# Patient Record
Sex: FEMALE | Race: ASIAN | Employment: FULL TIME | URBAN - METROPOLITAN AREA
[De-identification: names, ages, dates, MRNs, and addresses within clinical notes are randomized per-mention and may not be internally consistent; named-entity substitution may affect disease eponyms.]

---

## 2018-04-27 ENCOUNTER — OFFICE VISIT (OUTPATIENT)
Dept: INTERNAL MEDICINE CLINIC | Age: 33
End: 2018-04-27

## 2018-04-27 VITALS
BODY MASS INDEX: 31.02 KG/M2 | RESPIRATION RATE: 15 BRPM | OXYGEN SATURATION: 98 % | HEART RATE: 71 BPM | DIASTOLIC BLOOD PRESSURE: 74 MMHG | WEIGHT: 193 LBS | TEMPERATURE: 98.2 F | SYSTOLIC BLOOD PRESSURE: 100 MMHG | HEIGHT: 66 IN

## 2018-04-27 DIAGNOSIS — R11.2 NAUSEA AND VOMITING, INTRACTABILITY OF VOMITING NOT SPECIFIED, UNSPECIFIED VOMITING TYPE: ICD-10-CM

## 2018-04-27 DIAGNOSIS — R51.9 ACUTE NONINTRACTABLE HEADACHE, UNSPECIFIED HEADACHE TYPE: Primary | ICD-10-CM

## 2018-04-27 DIAGNOSIS — R09.81 SINUS CONGESTION: ICD-10-CM

## 2018-04-27 RX ORDER — ONDANSETRON 8 MG/1
8 TABLET, ORALLY DISINTEGRATING ORAL
Qty: 6 TAB | Refills: 0 | Status: SHIPPED | OUTPATIENT
Start: 2018-04-27 | End: 2018-04-29

## 2018-04-27 RX ORDER — FLUTICASONE PROPIONATE 50 MCG
2 SPRAY, SUSPENSION (ML) NASAL DAILY
Qty: 1 BOTTLE | Refills: 1 | Status: SHIPPED | OUTPATIENT
Start: 2018-04-27

## 2018-04-27 NOTE — PROGRESS NOTES
HISTORY OF PRESENT ILLNESS  Kalyan Parra is a 28 y.o. female. Patient reports 3 episodes of vomiting this morning, mostly phlegm. She has history of infrequent migraines and reports a migraine headache starting last night at posterior frontal/top of head region. Since vomiting this morning, the headache has improved. She took advil about 6 hours ago. She denies any vision changes. Patient has been dealing with URI/allergy symptoms all week, including worsening nasal congestion and sinus pressure. She has taken sudafed, antihistamine, and mucinex with mild relief. She does report significant post-nasal drip. She typically takes fioricet for migraines, but hasn't taken a dose with this episode. She also reports being on her menses currently. She reports residual nausea this morning, but no fever, abdominal pain, or diarrhea. Visit Vitals    /74 (BP 1 Location: Left arm, BP Patient Position: Sitting)    Pulse 71    Temp 98.2 °F (36.8 °C) (Oral)    Resp 15    Ht 5' 6\" (1.676 m)    Wt 193 lb (87.5 kg)    LMP 04/25/2018 (Exact Date)    SpO2 98%    BMI 31.15 kg/m2       Vomiting    The history is provided by the patient. This is a new problem. The current episode started 3 to 5 hours ago. The problem has been gradually improving. The emesis has an appearance of clear. There has been no fever. Associated symptoms include headaches, URI and headaches. Headache   Associated symptoms include headaches. Review of Systems   HENT: Positive for congestion. Gastrointestinal: Positive for nausea and vomiting. Neurological: Positive for headaches. Physical Exam   Constitutional: She is oriented to person, place, and time. She appears well-developed and well-nourished. HENT:   Head: Normocephalic. Right Ear: Hearing, tympanic membrane, external ear and ear canal normal.   Left Ear: Hearing, tympanic membrane, external ear and ear canal normal.   Nose: Mucosal edema and rhinorrhea present. Right sinus exhibits no maxillary sinus tenderness and no frontal sinus tenderness. Left sinus exhibits no maxillary sinus tenderness and no frontal sinus tenderness. Mouth/Throat: Uvula is midline, oropharynx is clear and moist and mucous membranes are normal.   Neck: Normal range of motion. Neck supple. Cardiovascular: Normal rate, regular rhythm and normal heart sounds. Pulmonary/Chest: Effort normal and breath sounds normal.   Lymphadenopathy:     She has no cervical adenopathy. Neurological: She is alert and oriented to person, place, and time. No cranial nerve deficit. Coordination normal.   Skin: Skin is warm and dry. Psychiatric: She has a normal mood and affect. ASSESSMENT and PLAN    ICD-10-CM ICD-9-CM    1. Acute nonintractable headache, unspecified headache type R51 784.0    2. Nausea and vomiting, intractability of vomiting not specified, unspecified vomiting type R11.2 787.01    3.  Sinus congestion R09.81 478.19      Orders Placed This Encounter    ondansetron (ZOFRAN ODT) 8 mg disintegrating tablet    fluticasone (FLONASE) 50 mcg/actuation nasal spray   zofran given for nausea  Advised to take NSAID or fioricet if headache returns today  Start flonase  Hydrate and rest

## 2018-04-27 NOTE — PROGRESS NOTES
Chief Complaint   Patient presents with    Vomiting     Pt c/o vomiting a few times    Headache     Pt c/o headche that started in the middle of the night. 1. Have you been to the ER, urgent care clinic since your last visit? Hospitalized since your last visit? No    2. Have you seen or consulted any other health care providers outside of the [address] [place] [address] since your last visit? Include any pap smears or colon screening.  No

## 2019-11-26 LAB
ANTIBODY SCREEN, EXTERNAL: NEGATIVE
CHLAMYDIA, EXTERNAL: NEGATIVE
HBSAG, EXTERNAL: NEGATIVE
HIV, EXTERNAL: NORMAL
N. GONORRHEA, EXTERNAL: NEGATIVE
RUBELLA, EXTERNAL: NORMAL
T. PALLIDUM, EXTERNAL: NORMAL
TYPE, ABO & RH, EXTERNAL: NORMAL

## 2020-05-22 ENCOUNTER — HOSPITAL ENCOUNTER (EMERGENCY)
Age: 35
Discharge: STILL ADMITTED OR ADMINISTRATIVE | End: 2020-05-22
Attending: STUDENT IN AN ORGANIZED HEALTH CARE EDUCATION/TRAINING PROGRAM

## 2020-05-22 ENCOUNTER — HOSPITAL ENCOUNTER (OUTPATIENT)
Age: 35
Setting detail: OBSERVATION
Discharge: HOME | End: 2020-05-23
Attending: OBSTETRICS & GYNECOLOGY | Admitting: OBSTETRICS & GYNECOLOGY
Payer: COMMERCIAL

## 2020-05-22 PROBLEM — O14.93 PRE-ECLAMPSIA DURING PREGNANCY IN THIRD TRIMESTER, ANTEPARTUM: Status: ACTIVE | Noted: 2020-05-22

## 2020-05-22 LAB
ALBUMIN SERPL-MCNC: 2.8 G/DL (ref 3.5–5)
ALBUMIN/GLOB SERPL: 0.8 {RATIO} (ref 1.1–2.2)
ALP SERPL-CCNC: 80 U/L (ref 45–117)
ALT SERPL-CCNC: 15 U/L (ref 12–78)
ANION GAP SERPL CALC-SCNC: 6 MMOL/L (ref 5–15)
AST SERPL-CCNC: 13 U/L (ref 15–37)
BASOPHILS # BLD: 0.1 K/UL (ref 0–0.1)
BASOPHILS NFR BLD: 0 % (ref 0–1)
BILIRUB SERPL-MCNC: 0.2 MG/DL (ref 0.2–1)
BUN SERPL-MCNC: 11 MG/DL (ref 6–20)
BUN/CREAT SERPL: 17 (ref 12–20)
CALCIUM SERPL-MCNC: 9.1 MG/DL (ref 8.5–10.1)
CHLORIDE SERPL-SCNC: 108 MMOL/L (ref 97–108)
CO2 SERPL-SCNC: 23 MMOL/L (ref 21–32)
CREAT SERPL-MCNC: 0.63 MG/DL (ref 0.55–1.02)
CREAT UR-MCNC: 138 MG/DL
DIFFERENTIAL METHOD BLD: ABNORMAL
EOSINOPHIL # BLD: 0.1 K/UL (ref 0–0.4)
EOSINOPHIL NFR BLD: 1 % (ref 0–7)
ERYTHROCYTE [DISTWIDTH] IN BLOOD BY AUTOMATED COUNT: 17.2 % (ref 11.5–14.5)
GLOBULIN SER CALC-MCNC: 3.5 G/DL (ref 2–4)
GLUCOSE SERPL-MCNC: 78 MG/DL (ref 65–100)
HCT VFR BLD AUTO: 35.1 % (ref 35–47)
HGB BLD-MCNC: 11.1 G/DL (ref 11.5–16)
IMM GRANULOCYTES # BLD AUTO: 0.2 K/UL (ref 0–0.04)
IMM GRANULOCYTES NFR BLD AUTO: 2 % (ref 0–0.5)
LDH SERPL L TO P-CCNC: 169 U/L (ref 81–246)
LYMPHOCYTES # BLD: 1.5 K/UL (ref 0.8–3.5)
LYMPHOCYTES NFR BLD: 13 % (ref 12–49)
MCH RBC QN AUTO: 24 PG (ref 26–34)
MCHC RBC AUTO-ENTMCNC: 31.6 G/DL (ref 30–36.5)
MCV RBC AUTO: 76 FL (ref 80–99)
MONOCYTES # BLD: 0.8 K/UL (ref 0–1)
MONOCYTES NFR BLD: 7 % (ref 5–13)
NEUTS SEG # BLD: 9.1 K/UL (ref 1.8–8)
NEUTS SEG NFR BLD: 77 % (ref 32–75)
NRBC # BLD: 0.03 K/UL (ref 0–0.01)
NRBC BLD-RTO: 0.3 PER 100 WBC
PLATELET # BLD AUTO: 214 K/UL (ref 150–400)
PMV BLD AUTO: 12.7 FL (ref 8.9–12.9)
POTASSIUM SERPL-SCNC: 4.2 MMOL/L (ref 3.5–5.1)
PROT SERPL-MCNC: 6.3 G/DL (ref 6.4–8.2)
PROT UR-MCNC: 56 MG/DL (ref 0–11.9)
PROT/CREAT UR-RTO: 0.4
RBC # BLD AUTO: 4.62 M/UL (ref 3.8–5.2)
SARS-COV-2, COV2: NOT DETECTED
SODIUM SERPL-SCNC: 137 MMOL/L (ref 136–145)
SPECIMEN SOURCE, FCOV2M: NORMAL
URATE SERPL-MCNC: 8.2 MG/DL (ref 2.6–6)
WBC # BLD AUTO: 11.8 K/UL (ref 3.6–11)

## 2020-05-22 PROCEDURE — 83615 LACTATE (LD) (LDH) ENZYME: CPT

## 2020-05-22 PROCEDURE — 99285 EMERGENCY DEPT VISIT HI MDM: CPT

## 2020-05-22 PROCEDURE — 74011250636 HC RX REV CODE- 250/636: Performed by: OBSTETRICS & GYNECOLOGY

## 2020-05-22 PROCEDURE — 99218 HC RM OBSERVATION: CPT

## 2020-05-22 PROCEDURE — 84550 ASSAY OF BLOOD/URIC ACID: CPT

## 2020-05-22 PROCEDURE — 85025 COMPLETE CBC W/AUTO DIFF WBC: CPT

## 2020-05-22 PROCEDURE — 96372 THER/PROPH/DIAG INJ SC/IM: CPT

## 2020-05-22 PROCEDURE — 74011250637 HC RX REV CODE- 250/637: Performed by: OBSTETRICS & GYNECOLOGY

## 2020-05-22 PROCEDURE — 80053 COMPREHEN METABOLIC PANEL: CPT

## 2020-05-22 PROCEDURE — 84156 ASSAY OF PROTEIN URINE: CPT

## 2020-05-22 PROCEDURE — 36415 COLL VENOUS BLD VENIPUNCTURE: CPT

## 2020-05-22 PROCEDURE — 87635 SARS-COV-2 COVID-19 AMP PRB: CPT

## 2020-05-22 RX ORDER — BUTALBITAL, ACETAMINOPHEN AND CAFFEINE 50; 325; 40 MG/1; MG/1; MG/1
1 TABLET ORAL
Status: DISCONTINUED | OUTPATIENT
Start: 2020-05-22 | End: 2020-05-23 | Stop reason: HOSPADM

## 2020-05-22 RX ORDER — CETIRIZINE HYDROCHLORIDE 10 MG/1
CAPSULE, LIQUID FILLED ORAL
COMMUNITY
End: 2020-06-24

## 2020-05-22 RX ORDER — SWAB
1 SWAB, NON-MEDICATED MISCELLANEOUS DAILY
Status: DISCONTINUED | OUTPATIENT
Start: 2020-05-23 | End: 2020-05-23 | Stop reason: HOSPADM

## 2020-05-22 RX ORDER — SERTRALINE HYDROCHLORIDE 50 MG/1
100 TABLET, FILM COATED ORAL DAILY
Status: DISCONTINUED | OUTPATIENT
Start: 2020-05-23 | End: 2020-05-23 | Stop reason: HOSPADM

## 2020-05-22 RX ORDER — ACETAMINOPHEN 325 MG/1
650 TABLET ORAL
Status: DISCONTINUED | OUTPATIENT
Start: 2020-05-22 | End: 2020-05-23 | Stop reason: HOSPADM

## 2020-05-22 RX ORDER — PROMETHAZINE HYDROCHLORIDE 25 MG/1
25 TABLET ORAL
Status: DISCONTINUED | OUTPATIENT
Start: 2020-05-22 | End: 2020-05-23 | Stop reason: HOSPADM

## 2020-05-22 RX ORDER — SODIUM CHLORIDE 0.9 % (FLUSH) 0.9 %
5-40 SYRINGE (ML) INJECTION AS NEEDED
Status: DISCONTINUED | OUTPATIENT
Start: 2020-05-22 | End: 2020-05-23 | Stop reason: HOSPADM

## 2020-05-22 RX ORDER — SODIUM CHLORIDE 0.9 % (FLUSH) 0.9 %
5-40 SYRINGE (ML) INJECTION EVERY 8 HOURS
Status: DISCONTINUED | OUTPATIENT
Start: 2020-05-22 | End: 2020-05-23 | Stop reason: HOSPADM

## 2020-05-22 RX ORDER — BETAMETHASONE SODIUM PHOSPHATE AND BETAMETHASONE ACETATE 3; 3 MG/ML; MG/ML
12 INJECTION, SUSPENSION INTRA-ARTICULAR; INTRALESIONAL; INTRAMUSCULAR; SOFT TISSUE EVERY 24 HOURS
Status: COMPLETED | OUTPATIENT
Start: 2020-05-22 | End: 2020-05-23

## 2020-05-22 RX ORDER — FAMOTIDINE 20 MG/1
20 TABLET, FILM COATED ORAL 2 TIMES DAILY
COMMUNITY
End: 2020-06-24

## 2020-05-22 RX ADMIN — BUTALBITAL, ACETAMINOPHEN, AND CAFFEINE 1 TABLET: 50; 325; 40 TABLET ORAL at 17:23

## 2020-05-22 RX ADMIN — BUTALBITAL, ACETAMINOPHEN, AND CAFFEINE 1 TABLET: 50; 325; 40 TABLET ORAL at 12:19

## 2020-05-22 RX ADMIN — BUTALBITAL, ACETAMINOPHEN, AND CAFFEINE 1 TABLET: 50; 325; 40 TABLET ORAL at 22:01

## 2020-05-22 RX ADMIN — BETAMETHASONE SODIUM PHOSPHATE AND BETAMETHASONE ACETATE 12 MG: 3; 3 INJECTION, SUSPENSION INTRA-ARTICULAR; INTRALESIONAL; INTRAMUSCULAR at 14:23

## 2020-05-22 NOTE — PROGRESS NOTES
1130  Pt arrived to L&D c/o headache, and elevated BP's, denies epigastric pain or blurred vision. [address] and sent. 1150  Dr. Lacy Car at bedside to examine pt, orders received. 36  Dr. Lacy Car notified of lab results, will be by to talk with pt.  1300  Dr. Sourav Almaguer at bedside, discussed plan for care, all questions answered. 1500  Pt sleeping. 1900  TRANSFER - OUT REPORT:    Verbal report given to TU Huitron RN(name) on Jose Quintana  being transferred to [facility]U(unit) for routine progression of care       Report consisted of patients Situation, Background, Assessment and   Recommendations(SBAR). Information from the following report(s) SBAR, Kardex, [facility] and Recent Results was reviewed with the receiving nurse. Lines:       Opportunity for questions and clarification was provided.       Patient transported with:   Registered Nurse

## 2020-05-22 NOTE — ED TRIAGE NOTES
Triage Note: Patient is coming in with leg swelling and headache since last week. Tuesday started with headache. Patient was seen at [place] office and had protein in urine.
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
## 2020-05-22 NOTE — PROGRESS NOTES
TRANSFER - IN REPORT:    Verbal report received from Sandhya RN (name) on Teddy Cassette  being received from L and D (unit) for routine post - op      Report consisted of patients Situation, Background, Assessment and   Recommendations(SBAR). Information from the following report(s) SBAR, Intake/Output, MAR, Accordion and Recent Results was reviewed with the receiving nurse. Opportunity for questions and clarification was provided. Assessment completed upon patients arrival to unit and care assumed.

## 2020-05-23 VITALS
WEIGHT: 246 LBS | DIASTOLIC BLOOD PRESSURE: 89 MMHG | SYSTOLIC BLOOD PRESSURE: 148 MMHG | HEART RATE: 90 BPM | OXYGEN SATURATION: 99 % | HEIGHT: 66 IN | BODY MASS INDEX: 39.53 KG/M2 | RESPIRATION RATE: 16 BRPM | TEMPERATURE: 98.3 F

## 2020-05-23 LAB
ALBUMIN SERPL-MCNC: 2.7 G/DL (ref 3.5–5)
ALBUMIN/GLOB SERPL: 0.7 {RATIO} (ref 1.1–2.2)
ALP SERPL-CCNC: 72 U/L (ref 45–117)
ALT SERPL-CCNC: 15 U/L (ref 12–78)
ANION GAP SERPL CALC-SCNC: 5 MMOL/L (ref 5–15)
AST SERPL-CCNC: 11 U/L (ref 15–37)
BILIRUB SERPL-MCNC: <0.1 MG/DL (ref 0.2–1)
BUN SERPL-MCNC: 13 MG/DL (ref 6–20)
BUN/CREAT SERPL: 18 (ref 12–20)
CALCIUM SERPL-MCNC: 9.1 MG/DL (ref 8.5–10.1)
CHLORIDE SERPL-SCNC: 108 MMOL/L (ref 97–108)
CO2 SERPL-SCNC: 25 MMOL/L (ref 21–32)
CREAT SERPL-MCNC: 0.73 MG/DL (ref 0.55–1.02)
ERYTHROCYTE [DISTWIDTH] IN BLOOD BY AUTOMATED COUNT: 17 % (ref 11.5–14.5)
GLOBULIN SER CALC-MCNC: 3.8 G/DL (ref 2–4)
GLUCOSE SERPL-MCNC: 99 MG/DL (ref 65–100)
HCT VFR BLD AUTO: 31.7 % (ref 35–47)
HGB BLD-MCNC: 10 G/DL (ref 11.5–16)
MCH RBC QN AUTO: 24 PG (ref 26–34)
MCHC RBC AUTO-ENTMCNC: 31.5 G/DL (ref 30–36.5)
MCV RBC AUTO: 76.2 FL (ref 80–99)
NRBC # BLD: 0.05 K/UL (ref 0–0.01)
NRBC BLD-RTO: 0.4 PER 100 WBC
PLATELET # BLD AUTO: 205 K/UL (ref 150–400)
PMV BLD AUTO: 11.9 FL (ref 8.9–12.9)
POTASSIUM SERPL-SCNC: 3.8 MMOL/L (ref 3.5–5.1)
PROT SERPL-MCNC: 6.5 G/DL (ref 6.4–8.2)
RBC # BLD AUTO: 4.16 M/UL (ref 3.8–5.2)
SODIUM SERPL-SCNC: 138 MMOL/L (ref 136–145)
WBC # BLD AUTO: 13.3 K/UL (ref 3.6–11)

## 2020-05-23 PROCEDURE — 74011250636 HC RX REV CODE- 250/636: Performed by: OBSTETRICS & GYNECOLOGY

## 2020-05-23 PROCEDURE — 96372 THER/PROPH/DIAG INJ SC/IM: CPT

## 2020-05-23 PROCEDURE — 80053 COMPREHEN METABOLIC PANEL: CPT

## 2020-05-23 PROCEDURE — 74011250637 HC RX REV CODE- 250/637: Performed by: OBSTETRICS & GYNECOLOGY

## 2020-05-23 PROCEDURE — 99218 HC RM OBSERVATION: CPT

## 2020-05-23 PROCEDURE — 59025 FETAL NON-STRESS TEST: CPT

## 2020-05-23 PROCEDURE — 85027 COMPLETE CBC AUTOMATED: CPT

## 2020-05-23 PROCEDURE — 36415 COLL VENOUS BLD VENIPUNCTURE: CPT

## 2020-05-23 RX ORDER — BUTALBITAL, ACETAMINOPHEN AND CAFFEINE 50; 325; 40 MG/1; MG/1; MG/1
1 TABLET ORAL
Status: COMPLETED | OUTPATIENT
Start: 2020-05-23 | End: 2020-05-23

## 2020-05-23 RX ORDER — LANOLIN ALCOHOL/MO/W.PET/CERES
400 CREAM (GRAM) TOPICAL DAILY
Status: DISCONTINUED | OUTPATIENT
Start: 2020-05-23 | End: 2020-05-23 | Stop reason: HOSPADM

## 2020-05-23 RX ORDER — DIPHENHYDRAMINE HCL 25 MG
25 CAPSULE ORAL
Status: DISCONTINUED | OUTPATIENT
Start: 2020-05-23 | End: 2020-05-23 | Stop reason: HOSPADM

## 2020-05-23 RX ORDER — LABETALOL 100 MG/1
100 TABLET, FILM COATED ORAL EVERY 12 HOURS
Status: DISCONTINUED | OUTPATIENT
Start: 2020-05-23 | End: 2020-05-23 | Stop reason: HOSPADM

## 2020-05-23 RX ORDER — LABETALOL 100 MG/1
100 TABLET, FILM COATED ORAL EVERY 12 HOURS
Qty: 60 TAB | Refills: 0 | Status: ON HOLD
Start: 2020-05-23 | End: 2020-06-20 | Stop reason: SDUPTHER

## 2020-05-23 RX ORDER — PROCHLORPERAZINE MALEATE 10 MG
10 TABLET ORAL
Status: DISCONTINUED | OUTPATIENT
Start: 2020-05-23 | End: 2020-05-23 | Stop reason: HOSPADM

## 2020-05-23 RX ORDER — LANOLIN ALCOHOL/MO/W.PET/CERES
400 CREAM (GRAM) TOPICAL DAILY
Qty: 60 TAB | Refills: 0 | Status: SHIPPED
Start: 2020-05-23 | End: 2020-06-24

## 2020-05-23 RX ORDER — PROCHLORPERAZINE MALEATE 10 MG
10 TABLET ORAL
Qty: 30 TAB | Refills: 0 | Status: SHIPPED
Start: 2020-05-23 | End: 2020-05-30

## 2020-05-23 RX ADMIN — LABETALOL HYDROCHLORIDE 100 MG: 100 TABLET, FILM COATED ORAL at 12:00

## 2020-05-23 RX ADMIN — BETAMETHASONE SODIUM PHOSPHATE AND BETAMETHASONE ACETATE 12 MG: 3; 3 INJECTION, SUSPENSION INTRA-ARTICULAR; INTRALESIONAL; INTRAMUSCULAR at 16:45

## 2020-05-23 RX ADMIN — Medication 1 TABLET: at 10:23

## 2020-05-23 RX ADMIN — BUTALBITAL, ACETAMINOPHEN, AND CAFFEINE 1 TABLET: 50; 325; 40 TABLET ORAL at 10:40

## 2020-05-23 RX ADMIN — DIPHENHYDRAMINE HYDROCHLORIDE 25 MG: 25 CAPSULE ORAL at 12:00

## 2020-05-23 RX ADMIN — PROCHLORPERAZINE MALEATE 10 MG: 10 TABLET ORAL at 12:00

## 2020-05-23 RX ADMIN — Medication 400 MG: at 19:24

## 2020-05-23 RX ADMIN — SERTRALINE HYDROCHLORIDE 100 MG: 50 TABLET ORAL at 10:24

## 2020-05-23 RX ADMIN — BUTALBITAL, ACETAMINOPHEN, AND CAFFEINE 1 TABLET: 50; 325; 40 TABLET ORAL at 10:23

## 2020-05-23 NOTE — PROGRESS NOTES
..Bedside shift change report given to Jennifer Samson (oncoming nurse) by Brigette Delgado RN (offgoing nurse). Report included the following information SBAR, Kardex, Intake/Output, MAR, Accordion and Recent Results.

## 2020-05-23 NOTE — PROGRESS NOTES
Ante Partum Progress Note    Sarai Yoder  33w3d    1. Anxiety disorder -  Continue Zoloft       2. Elevated blood-pressure reading without diagnosis of hypertension -  -BPs consistently mild range, some 150s/100s --> will start 100 mg PO BID labetalol. -HA no worse but not improved with fioricet. Trial of Benadryl/Compazine & Mg 400 mg PO  -Reviewed case with MFM -- does not sound like pre-e with severe features, delivery not indicated at this time -- continue plan for expectant mgmt.  -Plan BID NST  -Continue to observe, with hope that if feeling better in AM will be able to go home.        3. Gestation period, 33 weeks  -Reviewed plan for delivery at 37w  -Given potential for early delivery will give BMTZ, 2nd dose today 2PM.  -COVID swab neg. Orders/Charges: High and Non Stress Test    Patient states she has no new complaints. No fetal concerns. Still left sided HA, no visual changes or RUQ pain. Feels like swelling maybe a little worse. Vitals:  Visit Vitals  /80 (BP 1 Location: Right arm, BP Patient Position: At rest;Head of bed elevated (Comment degrees)) Comment (BP Patient Position): HOB 45   Pulse 92   Temp 98.5 °F (36.9 °C)   Resp 16   Ht 5' 6\" (1.676 m)   Wt 111.6 kg (246 lb)   SpO2 97%   Breastfeeding No   BMI 39.71 kg/m²     Temp (24hrs), Av.6 °F (37 °C), Min:98.5 °F (36.9 °C), Max:98.7 °F (37.1 °C)      Last 24hr Input/Output:  No intake or output data in the 24 hours ending 20 1136     Non stress test:  Reactive    Patient Vitals for the past 4 hrs: Mode Fetal Heart Rate Fetal Activity   20 1125 External     20 1010   Present      Patient Vitals for the past 4 hrs: Mode Fetal Heart Rate Fetal Activity   20 1125 External     20 1010   Present        Uterine Activity: None     Exam:  Patient without distress.      Abdomen, fundus soft non-tender     Extremities, no redness or tenderness               Additional Exam: Deferred    Labs: Lab Results   Component Value Date/Time    WBC 11.8 (H) 05/22/2020 11:40 AM    HGB 11.1 (L) 05/22/2020 11:40 AM    HCT 35.1 05/22/2020 11:40 AM    PLATELET 487 34/64/4207 11:40 AM       Recent Results (from the past 24 hour(s))   CBC WITH AUTOMATED DIFF    Collection Time: 05/22/20 11:40 AM   Result Value Ref Range    WBC 11.8 (H) 3.6 - 11.0 K/uL    RBC 4.62 3.80 - 5.20 M/uL    HGB 11.1 (L) 11.5 - 16.0 g/dL    HCT 35.1 35.0 - 47.0 %    MCV 76.0 (L) 80.0 - 99.0 FL    MCH 24.0 (L) 26.0 - 34.0 PG    MCHC 31.6 30.0 - 36.5 g/dL    RDW 17.2 (H) 11.5 - 14.5 %    PLATELET 778 435 - 601 K/uL    MPV 12.7 8.9 - 12.9 FL    NRBC 0.3 (H) 0  WBC    ABSOLUTE NRBC 0.03 (H) 0.00 - 0.01 K/uL    NEUTROPHILS 77 (H) 32 - 75 %    LYMPHOCYTES 13 12 - 49 %    MONOCYTES 7 5 - 13 %    EOSINOPHILS 1 0 - 7 %    BASOPHILS 0 0 - 1 %    IMMATURE GRANULOCYTES 2 (H) 0.0 - 0.5 %    ABS. NEUTROPHILS 9.1 (H) 1.8 - 8.0 K/UL    ABS. LYMPHOCYTES 1.5 0.8 - 3.5 K/UL    ABS. MONOCYTES 0.8 0.0 - 1.0 K/UL    ABS. EOSINOPHILS 0.1 0.0 - 0.4 K/UL    ABS. BASOPHILS 0.1 0.0 - 0.1 K/UL    ABS. IMM. GRANS. 0.2 (H) 0.00 - 0.04 K/UL    DF AUTOMATED     METABOLIC PANEL, COMPREHENSIVE    Collection Time: 05/22/20 11:40 AM   Result Value Ref Range    Sodium 137 136 - 145 mmol/L    Potassium 4.2 3.5 - 5.1 mmol/L    Chloride 108 97 - 108 mmol/L    CO2 23 21 - 32 mmol/L    Anion gap 6 5 - 15 mmol/L    Glucose 78 65 - 100 mg/dL    BUN 11 6 - 20 MG/DL    Creatinine 0.63 0.55 - 1.02 MG/DL    BUN/Creatinine ratio 17 12 - 20      GFR est AA >60 >60 ml/min/1.73m2    GFR est non-AA >60 >60 ml/min/1.73m2    Calcium 9.1 8.5 - 10.1 MG/DL    Bilirubin, total 0.2 0.2 - 1.0 MG/DL    ALT (SGPT) 15 12 - 78 U/L    AST (SGOT) 13 (L) 15 - 37 U/L    Alk.  phosphatase 80 45 - 117 U/L    Protein, total 6.3 (L) 6.4 - 8.2 g/dL    Albumin 2.8 (L) 3.5 - 5.0 g/dL    Globulin 3.5 2.0 - 4.0 g/dL    A-G Ratio 0.8 (L) 1.1 - 2.2     URIC ACID    Collection Time: 05/22/20 11:40 AM   Result Value Ref Range    Uric acid 8.2 (H) 2.6 - 6.0 MG/DL   LD    Collection Time: 05/22/20 11:40 AM   Result Value Ref Range     81 - 246 U/L   PROTEIN/CREATININE RATIO, URINE    Collection Time: 05/22/20 11:40 AM   Result Value Ref Range    Protein, urine random 56 (H) 0.0 - 11.9 mg/dL    Creatinine, urine 138.00 mg/dL    Protein/Creat.  urine Ratio 0.4     SARS-COV-2    Collection Time: 05/22/20  2:29 PM   Result Value Ref Range    Specimen source Nasopharyngeal      SARS-CoV-2 Not detected NOTD

## 2020-05-23 NOTE — PROGRESS NOTES
Bedside and Verbal shift change report given to TU Stoo RN (oncoming nurse) by CARLA Kc RN (offgoing nurse). Report included the following information SBAR, MAR and Recent Results. 1030   at [facility]. Notified of patient's /80,R arm sitting. Patient also L sided throbbing HA that is ongoing. Patient reports she vomited small amount of clear fluid a few minutes ago. Not nauseous. Fioricet 1 po given. 1155   at bedside discussing POC. Viewed fetal monitoring strip. 80  Dr. Hardy Dumont notified that lab never received lab work that was sent at   [address] [facility]. I informed Dr. Hardy Dumont that patient is sleeping at present and he agreed that lab work and [address] could be given when patient awakens (patient received compazine and benadry po at 1200, and Labetalol at 1200 today also). [facility]  Dr. Hardy Dumont notified of patient's BP of 148/89, R arm, sitting in bed. TAYLOR is totally gone, no visual disturbances, or RUQ pain. Dr. Hardy Dumont also notified that labs drawn and sent at [phone] 74. Patient may go outside in [facility] 23 with . 1845  Received order to discharge home. Rx will be called to patient's pharmacy. Return appointment is next Friday with a virtual office appointment to be scheduled on Tuesday. 1910  Discharge instructions given and reviewed with patient. All questions answered. Patient knows what signs and symptoms to be alert for and when to call her doctor. Patient knows when next appointment is. Patient knows what medications to continue at home. Patient instructed to rest daily and to call her OB if any questions. 1930  Patient left unit via WC.

## 2020-05-23 NOTE — DISCHARGE SUMMARY
Antepartum  Discharge Summary     Patient ID:  Joseline Apodaca  [id number]  5 y.o.  1985    Admit date: 5/22/2020    Discharge date: 5/23/2020    Admission Diagnoses:    Patient Active Problem List   Diagnosis Code    Pap smear for cervical cancer screening Z12.4    Gluten intolerance K90.41    Eczema L30.9    Anxiety F41.9    Obesity E66.9    Pre-eclampsia during pregnancy in third trimester, antepartum O14.93       Discharge Diagnoses: There are no discharge diagnoses documented for the most recent discharge. Patient Active Problem List   Diagnosis Code    Pap smear for cervical cancer screening Z12.4    Gluten intolerance K90.41    Eczema L30.9    Anxiety F41.9    Obesity E66.9    Pre-eclampsia during pregnancy in third trimester, antepartum O14.93       Procedures for this admission: Serial BPs, bloodwork, NST, Betamethasone    Hospital Course: Undelivered    Disposition: Home or self care    Discharged Condition: stable            Patient Instructions:   Current Discharge Medication List      START taking these medications    Details   labetaloL (NORMODYNE) 100 mg tablet Take 1 Tab by mouth every twelve (12) hours. Qty: 60 Tab, Refills: 0      magnesium oxide (MAG-OX) 400 mg tablet Take 1 Tab by mouth daily. Qty: 60 Tab, Refills: 0      prochlorperazine (COMPAZINE) 10 mg tablet Take 1 Tab by mouth every eight (8) hours as needed for Nausea or Vomiting for up to 7 days. Qty: 30 Tab, Refills: 0         CONTINUE these medications which have NOT CHANGED    Details   Cetirizine (ZyrTEC) 10 mg cap Take  by mouth. PNV Comb #2-Iron-Omega 3-FA [id number] mg cmpk Take  by mouth. famotidine (Pepcid) 20 mg tablet Take 20 mg by mouth two (2) times a day. sertraline (ZOLOFT) 50 mg tablet Take 1 Tab by mouth daily. Qty: 90 Tab, Refills: 3    Associated Diagnoses: Anxiety      fluticasone (FLONASE) 50 mcg/actuation nasal spray 2 Sprays by Both Nostrils route daily.   Qty: 1 Bottle, Refills: 1         STOP taking these medications       norgestimate-ethinyl estradiol (ORTHO-CYCLEN, 28,) 0.25-35 mg-mcg per tablet Comments:   Reason for Stopping:             Activity: Activity as tolerated and Ambulate in house  Diet: Regular Diet    Follow-up with   Follow-up Appointments   Procedures    FOLLOW UP VISIT Appointment in: 3 - 5 Days     Standing Status:   Standing     Number of Occurrences:   1     Order Specific Question:   Appointment in     Answer:   3 - 5 Days        Signed:  Aristeo Lei MD  5/23/2020  7:06 PM

## 2020-05-23 NOTE — DISCHARGE INSTRUCTIONS
Patient Education   Patient Education        Weeks 32 to 29 of Your Pregnancy: Care Instructions  Your Care Instructions    During the last few weeks of your pregnancy, you may have more aches and pains. It's important to rest when you can. Your growing baby is putting more pressure on your bladder. So you may need to urinate more often. Hemorrhoids are also common. These are painful, itchy veins in the rectal area. In the 36th week, most women have a test for group B streptococcus (GBS). GBS is a common bacteria that can live in the vagina and rectum. It can make your baby sick after birth. If you test positive, you will get antibiotics during labor. These will keep your baby from getting the bacteria. You may want to talk with your doctor about banking your baby's umbilical cord blood. This is the blood left in the cord after birth. If you want to save this blood, you must arrange it ahead of time. You can't decide at the last minute. If you haven't already had the Tdap shot during this pregnancy, talk to your doctor about getting it. It will help protect your  against pertussis infection. Follow-up care is a key part of your treatment and safety. Be sure to make and go to all appointments, and call your doctor if you are having problems. It's also a good idea to know your test results and keep a list of the medicines you take. How can you care for yourself at home? Ease hemorrhoids  · Get more liquids, fruits, vegetables, and fiber in your diet. This will help keep your stools soft. · Avoid sitting for too long. Lie on your left side several times a day. · Clean yourself with soft, moist toilet paper. Or you can use witch hazel pads or personal hygiene pads. · If you are uncomfortable, try ice packs. Or you can sit in a warm sitz bath. Do these for 20 minutes at a time, as needed. · Use hydrocortisone cream for pain and itching. Two examples are Anusol and Preparation H Hydrocortisone.   · Ask your doctor about taking an over-the-counter stool softener. Consider breastfeeding  · Experts recommend that women breastfeed for 1 year or longer. Breast milk is the perfect food for babies. · Breast milk is easier for babies to digest than formula. And it is always available, just the right temperature, and free. · Breast milk may help protect your child from some health problems.  babies are less likely than formula-fed babies to:  ? Get ear infections, colds, diarrhea, and pneumonia. ? Be obese or get diabetes later in life. · Women who breastfeed have less bleeding after the birth. Their uteruses also shrink back faster. · Some women who breastfeed lose weight faster. Making milk burns calories. · Breastfeeding can lower your risk of breast cancer, ovarian cancer, and osteoporosis. Decide about circumcision for boys  · As you make this decision, it may help to think about your personal, Mandaeism, and family traditions. You get to decide if you will keep your son's penis natural or if he will be circumcised. · If you decide that you would like to have your baby circumcised, talk with your doctor. You can share your concerns about pain. And you can discuss your preferences for anesthesia. Where can you learn more? Go to http://santy-mayo.info/  Enter X711 in the search box to learn more about \"Weeks 32 to 34 of Your Pregnancy: Care Instructions. \"  Current as of: May 29, 2019Content Version: 12.4  © 3452-4658 Healthwise, Incorporated. Care instructions adapted under license by [employer] (which disclaims liability or warranty for this information). If you have questions about a medical condition or this instruction, always ask your healthcare professional. [place] [address] any warranty or liability for your use of this information.             Preeclampsia: Care Instructions  Overview    Preeclampsia occurs when a woman's blood pressure rises during pregnancy. Often with preeclampsia, you also have swelling in your legs, hands, and face. A test may show too much protein in your urine. Preeclampsia is also called toxemia. If preeclampsia is severe and not treated, it can lead to seizures (eclampsia) and damage to your liver or kidneys. Preeclampsia can prevent your baby from getting enough food and oxygen. This can cause a low birth weight or other problems. Your doctor will watch you closely to prevent these problems. He or she also may recommend that you reduce your activity. If your preeclampsia is a danger to your health or the health of your baby, your doctor may need to deliver your baby early. While preeclampsia is a concern, most women with preeclampsia have healthy babies. After a woman gives birth, preeclampsia usually goes away on its own. But symptoms may last a few weeks or more and can get worse after delivery. Rarely, symptoms of preeclampsia don't show up until days or even weeks after childbirth. Follow-up care is a key part of your treatment and safety. Be sure to make and go to all appointments, and call your doctor if you are having problems. It's also a good idea to know your test results and keep a list of the medicines you take. How can you care for yourself at home? · Take and record your blood pressure at home if your doctor tells you to. ? Learn the importance of the two measures of blood pressure (such as 120 over 80, or 120/80). The first number is the systolic pressure, which is the force of blood on the artery walls as the heart pumps. The second number is the diastolic pressure, which is the force of blood on the artery walls between heartbeats, when the heart is at rest. You have a choice of monitors to use. ? Manual monitor: You pump up the cuff and use a stethoscope to listen for your pulse. ? Electronic monitor: The cuff inflates, and a gauge shows your pulse rate.   ? To take your blood pressure:  ? Ask your doctor to check your blood pressure monitor to be sure that it is accurate and that the cuff fits you. Also ask your doctor to watch you to make sure that you are using it right. ? You should not eat, use tobacco products, or use medicine known to raise blood pressure (such as some nasal decongestant sprays) before you take your blood pressure. ? Avoid taking your blood pressure if you have just exercised. Also avoid taking it if you are nervous or upset. Rest at least 15 minutes before you take your blood pressure. · If your doctor advises, check the protein levels in your urine. Your doctor or nurse will show you how to do this. · Take your medicines exactly as prescribed. Call your doctor if you think you are having a problem with your medicine. · Do not smoke. Quitting smoking will help improve your baby's growth and health. If you need help quitting, talk to your doctor about stop-smoking programs and medicines. These can increase your chances of quitting for good. · Eat a balanced and healthy diet that has lots of fruits and vegetables. · If your doctor advised bed rest, be sure to stay off your feet and rest as much as possible. ? Keep a phone, phone book, notepad, and pen near the bed where you can easily reach them. ? Gently stretch your legs every hour to maintain good blood flow. ? Have another family member pack snacks and lunch food in a cooler close to your bed. ? Use this time for activities that you usually cannot find time for, such as reading, craft projects, or letter writing. · You can keep track of your baby's health by noting the length of time it takes to count 10 movements (such as kicks, flutters, or rolls). Feeling 10 movements in less than 1 hour is considered normal. Track your baby's movements once each day. Bring this record with you to each prenatal visit. When should you call for help? Call 911 anytime you think you may need emergency care.  For example, call if:    · You passed out (lost consciousness).     · You have a seizure.    Call your doctor now or seek immediate medical care if:    · You have symptoms of preeclampsia, such as:  ? Sudden swelling of your face, hands, or feet. ? New vision problems (such as dimness, blurring, or seeing spots). ? A severe headache.     · Your blood pressure is higher than it should be, or it rises suddenly.     · You have new nausea or vomiting.     · You think that you are in labor.     · You have pain in your belly or pelvis.    Watch closely for changes in your health, and be sure to contact your doctor if:    · You gain weight rapidly. Where can you learn more? Go to http://santy-mayo.info/  Enter Z954 in the search box to learn more about \"Preeclampsia: Care Instructions. \"  Current as of: May 29, 2019Content Version: 12.4  © 7976-4147 Healthwise, Incorporated. Care instructions adapted under license by [employer] (which disclaims liability or warranty for this information). If you have questions about a medical condition or this instruction, always ask your healthcare professional. [address] any warranty or liability for your use of this information.

## 2020-05-23 NOTE — PROGRESS NOTES
Notified by nurse that pt is feeling better. HA has resolved. BPs mild range. Recent labs WNL. Reviewed plan for discharge home on labetalol with close outpatient follow up. BP and symptom precautions reviewed. Reviewed continued recommendation for delivery at 37w if not indicated sooner. Pt questions answered and she is in agreement with plan. Labetalol rx and compazine rx sent to pt's pharmacy via e-rx through [facility].

## 2020-06-04 LAB — GRBS, EXTERNAL: NEGATIVE

## 2020-06-16 ENCOUNTER — HOSPITAL ENCOUNTER (INPATIENT)
Age: 35
LOS: 4 days | Discharge: HOME | End: 2020-06-20
Attending: OBSTETRICS & GYNECOLOGY | Admitting: OBSTETRICS & GYNECOLOGY
Payer: COMMERCIAL

## 2020-06-16 DIAGNOSIS — O14.93 PRE-ECLAMPSIA DURING PREGNANCY IN THIRD TRIMESTER, ANTEPARTUM: Primary | ICD-10-CM

## 2020-06-16 PROBLEM — O14.90 PREECLAMPSIA: Status: ACTIVE | Noted: 2020-06-16

## 2020-06-16 LAB
ALBUMIN SERPL-MCNC: 2.8 G/DL (ref 3.5–5)
ALBUMIN/GLOB SERPL: 0.8 {RATIO} (ref 1.1–2.2)
ALP SERPL-CCNC: 106 U/L (ref 45–117)
ALT SERPL-CCNC: 15 U/L (ref 12–78)
ANION GAP SERPL CALC-SCNC: 9 MMOL/L (ref 5–15)
AST SERPL-CCNC: 11 U/L (ref 15–37)
BASOPHILS # BLD: 0 K/UL (ref 0–0.1)
BASOPHILS NFR BLD: 0 % (ref 0–1)
BILIRUB SERPL-MCNC: 0.2 MG/DL (ref 0.2–1)
BUN SERPL-MCNC: 11 MG/DL (ref 6–20)
BUN/CREAT SERPL: 15 (ref 12–20)
CALCIUM SERPL-MCNC: 8.4 MG/DL (ref 8.5–10.1)
CHLORIDE SERPL-SCNC: 105 MMOL/L (ref 97–108)
CO2 SERPL-SCNC: 22 MMOL/L (ref 21–32)
CREAT SERPL-MCNC: 0.73 MG/DL (ref 0.55–1.02)
DIFFERENTIAL METHOD BLD: ABNORMAL
EOSINOPHIL # BLD: 0.1 K/UL (ref 0–0.4)
EOSINOPHIL NFR BLD: 1 % (ref 0–7)
ERYTHROCYTE [DISTWIDTH] IN BLOOD BY AUTOMATED COUNT: 17.9 % (ref 11.5–14.5)
GLOBULIN SER CALC-MCNC: 3.5 G/DL (ref 2–4)
GLUCOSE SERPL-MCNC: 96 MG/DL (ref 65–100)
HCT VFR BLD AUTO: 35.6 % (ref 35–47)
HGB BLD-MCNC: 11.5 G/DL (ref 11.5–16)
IMM GRANULOCYTES # BLD AUTO: 0.1 K/UL (ref 0–0.04)
IMM GRANULOCYTES NFR BLD AUTO: 1 % (ref 0–0.5)
LDH SERPL L TO P-CCNC: 188 U/L (ref 81–246)
LYMPHOCYTES # BLD: 1.5 K/UL (ref 0.8–3.5)
LYMPHOCYTES NFR BLD: 13 % (ref 12–49)
MCH RBC QN AUTO: 24.7 PG (ref 26–34)
MCHC RBC AUTO-ENTMCNC: 32.3 G/DL (ref 30–36.5)
MCV RBC AUTO: 76.4 FL (ref 80–99)
MONOCYTES # BLD: 0.8 K/UL (ref 0–1)
MONOCYTES NFR BLD: 7 % (ref 5–13)
NEUTS SEG # BLD: 9.1 K/UL (ref 1.8–8)
NEUTS SEG NFR BLD: 78 % (ref 32–75)
NRBC # BLD: 0.03 K/UL (ref 0–0.01)
NRBC BLD-RTO: 0.3 PER 100 WBC
PLATELET # BLD AUTO: 207 K/UL (ref 150–400)
POTASSIUM SERPL-SCNC: 4 MMOL/L (ref 3.5–5.1)
PROT SERPL-MCNC: 6.3 G/DL (ref 6.4–8.2)
RBC # BLD AUTO: 4.66 M/UL (ref 3.8–5.2)
SODIUM SERPL-SCNC: 136 MMOL/L (ref 136–145)
URATE SERPL-MCNC: 8.4 MG/DL (ref 2.6–6)
WBC # BLD AUTO: 11.6 K/UL (ref 3.6–11)

## 2020-06-16 PROCEDURE — 74011250637 HC RX REV CODE- 250/637: Performed by: ADVANCED PRACTICE MIDWIFE

## 2020-06-16 PROCEDURE — 36415 COLL VENOUS BLD VENIPUNCTURE: CPT

## 2020-06-16 PROCEDURE — 83615 LACTATE (LD) (LDH) ENZYME: CPT

## 2020-06-16 PROCEDURE — 85025 COMPLETE CBC W/AUTO DIFF WBC: CPT

## 2020-06-16 PROCEDURE — 77030018836 HC SOL IRR NACL ICUM -A

## 2020-06-16 PROCEDURE — 84550 ASSAY OF BLOOD/URIC ACID: CPT

## 2020-06-16 PROCEDURE — 77030040361 HC SLV COMPR DVT MDII -B

## 2020-06-16 PROCEDURE — 77030041076 HC DRSG AG OPTICELL MDII -A

## 2020-06-16 PROCEDURE — 77030011220 HC DEV ELECSURG COVD -B

## 2020-06-16 PROCEDURE — 74011250637 HC RX REV CODE- 250/637: Performed by: OBSTETRICS & GYNECOLOGY

## 2020-06-16 PROCEDURE — 65270000029 HC RM PRIVATE

## 2020-06-16 PROCEDURE — 77030040012

## 2020-06-16 PROCEDURE — 75410000002 HC LABOR FEE PER 1 HR

## 2020-06-16 PROCEDURE — 80053 COMPREHEN METABOLIC PANEL: CPT

## 2020-06-16 PROCEDURE — 4A1HXCZ MONITORING OF PRODUCTS OF CONCEPTION, CARDIAC RATE, EXTERNAL APPROACH: ICD-10-PCS | Performed by: OBSTETRICS & GYNECOLOGY

## 2020-06-16 PROCEDURE — 3E033VJ INTRODUCTION OF OTHER HORMONE INTO PERIPHERAL VEIN, PERCUTANEOUS APPROACH: ICD-10-PCS | Performed by: OBSTETRICS & GYNECOLOGY

## 2020-06-16 PROCEDURE — 77030018846 HC SOL IRR STRL H20 ICUM -A

## 2020-06-16 RX ORDER — ACETAMINOPHEN 325 MG/1
650 TABLET ORAL
Status: DISCONTINUED | OUTPATIENT
Start: 2020-06-16 | End: 2020-06-20 | Stop reason: HOSPADM

## 2020-06-16 RX ORDER — SODIUM CHLORIDE, SODIUM LACTATE, POTASSIUM CHLORIDE, CALCIUM CHLORIDE 600; 310; 30; 20 MG/100ML; MG/100ML; MG/100ML; MG/100ML
125 INJECTION, SOLUTION INTRAVENOUS CONTINUOUS
Status: DISCONTINUED | OUTPATIENT
Start: 2020-06-17 | End: 2020-06-20 | Stop reason: HOSPADM

## 2020-06-16 RX ORDER — ZOLPIDEM TARTRATE 5 MG/1
5 TABLET ORAL
Status: COMPLETED | OUTPATIENT
Start: 2020-06-16 | End: 2020-06-16

## 2020-06-16 RX ORDER — OMEPRAZOLE 20 MG/1
20 TABLET, DELAYED RELEASE ORAL DAILY
COMMUNITY
End: 2020-06-24

## 2020-06-16 RX ORDER — LABETALOL 100 MG/1
100 TABLET, FILM COATED ORAL EVERY 12 HOURS
Status: DISCONTINUED | OUTPATIENT
Start: 2020-06-16 | End: 2020-06-20 | Stop reason: HOSPADM

## 2020-06-16 RX ORDER — TERBUTALINE SULFATE 1 MG/ML
0.25 INJECTION SUBCUTANEOUS AS NEEDED
Status: DISCONTINUED | OUTPATIENT
Start: 2020-06-16 | End: 2020-06-17 | Stop reason: HOSPADM

## 2020-06-16 RX ORDER — BUTORPHANOL TARTRATE 1 MG/ML
1 INJECTION INTRAMUSCULAR; INTRAVENOUS
Status: DISCONTINUED | OUTPATIENT
Start: 2020-06-16 | End: 2020-06-20 | Stop reason: HOSPADM

## 2020-06-16 RX ORDER — OXYTOCIN/0.9 % SODIUM CHLORIDE 30/500 ML
0-25 PLASTIC BAG, INJECTION (ML) INTRAVENOUS
Status: DISCONTINUED | OUTPATIENT
Start: 2020-06-17 | End: 2020-06-17 | Stop reason: HOSPADM

## 2020-06-16 RX ADMIN — LABETALOL HYDROCHLORIDE 100 MG: 100 TABLET, FILM COATED ORAL at 21:35

## 2020-06-16 RX ADMIN — ZOLPIDEM TARTRATE 5 MG: 5 TABLET ORAL at 21:35

## 2020-06-16 RX ADMIN — ACETAMINOPHEN 650 MG: 325 TABLET ORAL at 23:10

## 2020-06-16 NOTE — H&P
History & Physical    Name: Manju Mejia MRN: [id number]  SSN: [id number]    YOB: 1985  Age: 38 y.o. Sex: female      Subjective:     Estimated Date of Delivery: 20  OB History    Para Term  AB Living   2       1     SAB TAB Ectopic Molar Multiple Live Births   1                # Outcome Date GA Lbr Yonathan/2nd Weight Sex Delivery Anes PTL Lv   2 Current            1 SAB            cc: none    34yo  @ 36w6d presents for cervical ripening with planned IOL tomorrow for PreEclampsia. She denies HA, change in vision, RUQ pain. She denies contractions. Her baby is active. She's been taking labetalol 100mg PO BID - next dose is due at 2130 tonight. Past Medical History:   Diagnosis Date    Anxiety     Eczema     Gestational hypertension     Gluten intolerance     Obesity      Past Surgical History:   Procedure Laterality Date    HX ENDOSCOPY      Dr. Laura Ohara, gastric erosion    HX OTHER SURGICAL       Social History     Occupational History    Not on file   Tobacco Use    Smoking status: Never Smoker    Smokeless tobacco: Never Used   Substance and Sexual Activity    Alcohol use: Never     Frequency: Never     Comment: occ    Drug use: No    Sexual activity: Yes     Partners: Male     Birth control/protection: Pill, None     Family History   Problem Relation Age of Onset    Elevated Lipids Mother     Hypertension Mother     Cancer Brother 4        leukemia       Allergies   Allergen Reactions    Codeine Nausea and Vomiting    Neosporin + Pain Relief [Neomycin-Polymyxin-Pramoxine] Rash     Patient cannot take or use and medication that has Neosporin plus     Prior to Admission medications    Medication Sig Start Date End Date Taking? Authorizing Provider   omeprazole (PriLOSEC OTC) 20 mg tablet Take 20 mg by mouth daily. Yes Provider, Historical   labetaloL (NORMODYNE) 100 mg tablet Take 1 Tab by mouth every twelve (12) hours.  20  Yes Mik Luan Carolina MD   magnesium oxide (MAG-OX) 400 mg tablet Take 1 Tab by mouth daily. 20  Yes Jaqueline Colon MD   Cetirizine (ZyrTEC) 10 mg cap Take  by mouth. Yes Provider, Historical   PNV Comb #2-Iron-Omega 3-FA [id number] mg cmpk Take  by mouth. Yes Provider, Historical   sertraline (ZOLOFT) 50 mg tablet Take 1 Tab by mouth daily. Patient taking differently: Take 100 mg by mouth daily. 14  Yes Justina Loredo PA   famotidine (Pepcid) 20 mg tablet Take 20 mg by mouth two (2) times a day. Provider, Historical   fluticasone (FLONASE) 50 mcg/actuation nasal spray 2 Sprays by Both Nostrils route daily. 18   Oneal Witt NP        Review of Systems: A comprehensive review of systems was negative except for that written in the History of Present Illness. Objective:     Vitals:  Vitals:    20 1630 20 1655   BP:  149/88   Pulse:  97   Resp:  16   Temp:  98.4 °F (36.9 °C)   Weight: 109.8 kg (242 lb)    Height: 5' 6\" (1.676 m)         Physical Exam:  GEN: NAD, resting comfortably  Pulm: no resp distress  Abd: soft, gravid, NT  : no lesions, cervix 2cm/50%  Bedside ultrasound confirmed vertex presentation  Hunter placed in cervix - 60mL in uterine and vaginal balloons    Fetal Heart Rate: Cat 1  Tocometry: no contractions         Prenatal Labs:   Lab Results   Component Value Date/Time    Rubella, External immune 2019    HBsAg, External negative 2019    HIV, External non-reactive 2019    Gonorrhea, External negative 2019    Chlamydia, External negative 2019    ABO,Rh O positive 2019    GrBStrep, External negative 2020       Impression/Plan:     45yo  @ 36w6d with PreEclampsia presents for cervical ripening. 1. PreEclampsia: mild range BP on admission and patient asymptomatic. Will check PreE labs and continue labetalol 100mg q12 hrs.  2. IOL: hunter placed, will start pitocin overnight  3.  Suspected LGA: last growth ultrasound 5/29/20 >99% 7lb 1oz  4. GBS neg 6/4/20  5.  COVID neg

## 2020-06-16 NOTE — PROGRESS NOTES
1640   36yo admitted to L&D for induction of labor due to hypertension, is on labetalol 100mg bid, s/o Dr. Kim Chapa. 1800  Dr. Marcia Marin at bedside, sve / cervical balloon placed. Portable ultrasound done, vertex noted.

## 2020-06-17 ENCOUNTER — ANESTHESIA (OUTPATIENT)
Dept: LABOR AND DELIVERY | Age: 35
End: 2020-06-17
Payer: COMMERCIAL

## 2020-06-17 ENCOUNTER — ANESTHESIA EVENT (OUTPATIENT)
Dept: LABOR AND DELIVERY | Age: 35
End: 2020-06-17
Payer: COMMERCIAL

## 2020-06-17 PROCEDURE — 74011250636 HC RX REV CODE- 250/636: Performed by: OBSTETRICS & GYNECOLOGY

## 2020-06-17 PROCEDURE — 77030010848 HC CATH INTUTR PRSS KOLB -B

## 2020-06-17 PROCEDURE — 77030012890

## 2020-06-17 PROCEDURE — 77030019905 HC CATH URETH INTMIT MDII -A

## 2020-06-17 PROCEDURE — 74011000258 HC RX REV CODE- 258: Performed by: ADVANCED PRACTICE MIDWIFE

## 2020-06-17 PROCEDURE — 76010000391 HC C SECN FIRST 1 HR: Performed by: OBSTETRICS & GYNECOLOGY

## 2020-06-17 PROCEDURE — 74011250636 HC RX REV CODE- 250/636: Performed by: ADVANCED PRACTICE MIDWIFE

## 2020-06-17 PROCEDURE — 74011000258 HC RX REV CODE- 258: Performed by: OBSTETRICS & GYNECOLOGY

## 2020-06-17 PROCEDURE — 88307 TISSUE EXAM BY PATHOLOGIST: CPT

## 2020-06-17 PROCEDURE — A4300 CATH IMPL VASC ACCESS PORTAL: HCPCS

## 2020-06-17 PROCEDURE — 74011250636 HC RX REV CODE- 250/636: Performed by: ANESTHESIOLOGY

## 2020-06-17 PROCEDURE — 77030040830 HC CATH URETH FOL MDII -A

## 2020-06-17 PROCEDURE — 75410000003 HC RECOV DEL/VAG/CSECN EA 0.5 HR: Performed by: OBSTETRICS & GYNECOLOGY

## 2020-06-17 PROCEDURE — 76010000392 HC C SECN EA ADDL 0.5 HR: Performed by: OBSTETRICS & GYNECOLOGY

## 2020-06-17 PROCEDURE — 65270000029 HC RM PRIVATE

## 2020-06-17 PROCEDURE — 74011250636 HC RX REV CODE- 250/636: Performed by: NURSE ANESTHETIST, CERTIFIED REGISTERED

## 2020-06-17 PROCEDURE — 76060000078 HC EPIDURAL ANESTHESIA: Performed by: OBSTETRICS & GYNECOLOGY

## 2020-06-17 PROCEDURE — 74011000250 HC RX REV CODE- 250: Performed by: NURSE ANESTHETIST, CERTIFIED REGISTERED

## 2020-06-17 PROCEDURE — 75410000002 HC LABOR FEE PER 1 HR

## 2020-06-17 PROCEDURE — 74011000250 HC RX REV CODE- 250: Performed by: ANESTHESIOLOGY

## 2020-06-17 PROCEDURE — 74011250636 HC RX REV CODE- 250/636

## 2020-06-17 PROCEDURE — 74011000250 HC RX REV CODE- 250: Performed by: OBSTETRICS & GYNECOLOGY

## 2020-06-17 PROCEDURE — 74011250637 HC RX REV CODE- 250/637: Performed by: OBSTETRICS & GYNECOLOGY

## 2020-06-17 RX ORDER — LIDOCAINE HYDROCHLORIDE AND EPINEPHRINE 15; 5 MG/ML; UG/ML
INJECTION, SOLUTION EPIDURAL
Status: COMPLETED | OUTPATIENT
Start: 2020-06-17 | End: 2020-06-17

## 2020-06-17 RX ORDER — GENTAMICIN SULFATE 80 MG/100ML
INJECTION, SOLUTION INTRAVENOUS
Status: DISCONTINUED
Start: 2020-06-17 | End: 2020-06-17 | Stop reason: WASHOUT

## 2020-06-17 RX ORDER — SIMETHICONE 80 MG
80 TABLET,CHEWABLE ORAL AS NEEDED
Status: DISCONTINUED | OUTPATIENT
Start: 2020-06-17 | End: 2020-06-20 | Stop reason: HOSPADM

## 2020-06-17 RX ORDER — DOCUSATE SODIUM 100 MG/1
100 CAPSULE, LIQUID FILLED ORAL 2 TIMES DAILY
Status: DISCONTINUED | OUTPATIENT
Start: 2020-06-18 | End: 2020-06-20 | Stop reason: HOSPADM

## 2020-06-17 RX ORDER — CEFAZOLIN SODIUM/WATER 2 G/20 ML
2 SYRINGE (ML) INTRAVENOUS ONCE
Status: COMPLETED | OUTPATIENT
Start: 2020-06-17 | End: 2020-06-17

## 2020-06-17 RX ORDER — SODIUM CHLORIDE 0.9 % (FLUSH) 0.9 %
5-40 SYRINGE (ML) INJECTION EVERY 8 HOURS
Status: DISCONTINUED | OUTPATIENT
Start: 2020-06-17 | End: 2020-06-20 | Stop reason: HOSPADM

## 2020-06-17 RX ORDER — BUPIVACAINE HYDROCHLORIDE 2.5 MG/ML
20 INJECTION, SOLUTION EPIDURAL; INFILTRATION; INTRACAUDAL ONCE
Status: DISPENSED | OUTPATIENT
Start: 2020-06-17 | End: 2020-06-17

## 2020-06-17 RX ORDER — NALOXONE HYDROCHLORIDE 0.4 MG/ML
0.4 INJECTION, SOLUTION INTRAMUSCULAR; INTRAVENOUS; SUBCUTANEOUS AS NEEDED
Status: DISCONTINUED | OUTPATIENT
Start: 2020-06-17 | End: 2020-06-20 | Stop reason: HOSPADM

## 2020-06-17 RX ORDER — BUPIVACAINE HYDROCHLORIDE 2.5 MG/ML
INJECTION, SOLUTION EPIDURAL; INFILTRATION; INTRACAUDAL
Status: COMPLETED | OUTPATIENT
Start: 2020-06-17 | End: 2020-06-17

## 2020-06-17 RX ORDER — FENTANYL CITRATE 50 UG/ML
INJECTION, SOLUTION INTRAMUSCULAR; INTRAVENOUS
Status: COMPLETED | OUTPATIENT
Start: 2020-06-17 | End: 2020-06-17

## 2020-06-17 RX ORDER — LIDOCAINE HYDROCHLORIDE AND EPINEPHRINE 20; 5 MG/ML; UG/ML
INJECTION, SOLUTION EPIDURAL; INFILTRATION; INTRACAUDAL; PERINEURAL
Status: DISPENSED
Start: 2020-06-17 | End: 2020-06-18

## 2020-06-17 RX ORDER — MORPHINE SULFATE 2 MG/ML
INJECTION, SOLUTION INTRAMUSCULAR; INTRAVENOUS AS NEEDED
Status: DISCONTINUED | OUTPATIENT
Start: 2020-06-17 | End: 2020-06-17 | Stop reason: HOSPADM

## 2020-06-17 RX ORDER — OXYTOCIN 10 [USP'U]/ML
INJECTION, SOLUTION INTRAMUSCULAR; INTRAVENOUS AS NEEDED
Status: DISCONTINUED | OUTPATIENT
Start: 2020-06-17 | End: 2020-06-17 | Stop reason: HOSPADM

## 2020-06-17 RX ORDER — FENTANYL/BUPIVACAINE/NS/PF 2-1250MCG
10 PREFILLED PUMP RESERVOIR EPIDURAL CONTINUOUS
Status: DISCONTINUED | OUTPATIENT
Start: 2020-06-17 | End: 2020-06-17 | Stop reason: HOSPADM

## 2020-06-17 RX ORDER — DEXAMETHASONE SODIUM PHOSPHATE 4 MG/ML
INJECTION, SOLUTION INTRA-ARTICULAR; INTRALESIONAL; INTRAMUSCULAR; INTRAVENOUS; SOFT TISSUE AS NEEDED
Status: DISCONTINUED | OUTPATIENT
Start: 2020-06-17 | End: 2020-06-17 | Stop reason: HOSPADM

## 2020-06-17 RX ORDER — OXYTOCIN/RINGER'S LACTATE 20/1000 ML
999 PLASTIC BAG, INJECTION (ML) INTRAVENOUS ONCE
Status: ACTIVE | OUTPATIENT
Start: 2020-06-17 | End: 2020-06-18

## 2020-06-17 RX ORDER — ACETAMINOPHEN 10 MG/ML
INJECTION, SOLUTION INTRAVENOUS AS NEEDED
Status: DISCONTINUED | OUTPATIENT
Start: 2020-06-17 | End: 2020-06-17 | Stop reason: HOSPADM

## 2020-06-17 RX ORDER — LIDOCAINE HYDROCHLORIDE 10 MG/ML
INJECTION INFILTRATION; PERINEURAL
Status: DISCONTINUED
Start: 2020-06-17 | End: 2020-06-17 | Stop reason: WASHOUT

## 2020-06-17 RX ORDER — EPHEDRINE SULFATE/0.9% NACL/PF 50 MG/5 ML
12.5 SYRINGE (ML) INTRAVENOUS
Status: DISCONTINUED | OUTPATIENT
Start: 2020-06-17 | End: 2020-06-17 | Stop reason: HOSPADM

## 2020-06-17 RX ORDER — ONDANSETRON 2 MG/ML
INJECTION INTRAMUSCULAR; INTRAVENOUS
Status: COMPLETED
Start: 2020-06-17 | End: 2020-06-17

## 2020-06-17 RX ORDER — EPHEDRINE SULFATE/0.9% NACL/PF 50 MG/5 ML
SYRINGE (ML) INTRAVENOUS AS NEEDED
Status: DISCONTINUED | OUTPATIENT
Start: 2020-06-17 | End: 2020-06-17 | Stop reason: HOSPADM

## 2020-06-17 RX ORDER — DIPHENHYDRAMINE HYDROCHLORIDE 50 MG/ML
12.5 INJECTION, SOLUTION INTRAMUSCULAR; INTRAVENOUS
Status: DISCONTINUED | OUTPATIENT
Start: 2020-06-17 | End: 2020-06-20 | Stop reason: HOSPADM

## 2020-06-17 RX ORDER — SODIUM CHLORIDE, SODIUM LACTATE, POTASSIUM CHLORIDE, CALCIUM CHLORIDE 600; 310; 30; 20 MG/100ML; MG/100ML; MG/100ML; MG/100ML
150 INJECTION, SOLUTION INTRAVENOUS CONTINUOUS
Status: DISCONTINUED | OUTPATIENT
Start: 2020-06-17 | End: 2020-06-20 | Stop reason: HOSPADM

## 2020-06-17 RX ORDER — ONDANSETRON 2 MG/ML
4 INJECTION INTRAMUSCULAR; INTRAVENOUS
Status: DISCONTINUED | OUTPATIENT
Start: 2020-06-17 | End: 2020-06-20 | Stop reason: HOSPADM

## 2020-06-17 RX ORDER — SODIUM CHLORIDE 0.9 % (FLUSH) 0.9 %
5-40 SYRINGE (ML) INJECTION AS NEEDED
Status: DISCONTINUED | OUTPATIENT
Start: 2020-06-17 | End: 2020-06-20 | Stop reason: HOSPADM

## 2020-06-17 RX ORDER — IBUPROFEN 400 MG/1
800 TABLET ORAL EVERY 8 HOURS
Status: DISCONTINUED | OUTPATIENT
Start: 2020-06-17 | End: 2020-06-20 | Stop reason: HOSPADM

## 2020-06-17 RX ORDER — ONDANSETRON 2 MG/ML
4 INJECTION INTRAMUSCULAR; INTRAVENOUS ONCE
Status: COMPLETED | OUTPATIENT
Start: 2020-06-17 | End: 2020-06-17

## 2020-06-17 RX ORDER — ONDANSETRON 2 MG/ML
INJECTION INTRAMUSCULAR; INTRAVENOUS AS NEEDED
Status: DISCONTINUED | OUTPATIENT
Start: 2020-06-17 | End: 2020-06-17 | Stop reason: HOSPADM

## 2020-06-17 RX ORDER — MORPHINE SULFATE 0.5 MG/ML
INJECTION, SOLUTION EPIDURAL; INTRATHECAL; INTRAVENOUS AS NEEDED
Status: DISCONTINUED | OUTPATIENT
Start: 2020-06-17 | End: 2020-06-17 | Stop reason: HOSPADM

## 2020-06-17 RX ORDER — OXYTOCIN/RINGER'S LACTATE 20/1000 ML
1 PLASTIC BAG, INJECTION (ML) INTRAVENOUS ONCE
Status: DISCONTINUED | OUTPATIENT
Start: 2020-06-17 | End: 2020-06-17

## 2020-06-17 RX ORDER — FENTANYL CITRATE 50 UG/ML
100 INJECTION, SOLUTION INTRAMUSCULAR; INTRAVENOUS ONCE
Status: DISPENSED | OUTPATIENT
Start: 2020-06-17 | End: 2020-06-17

## 2020-06-17 RX ORDER — OXYTOCIN/RINGER'S LACTATE 20/1000 ML
PLASTIC BAG, INJECTION (ML) INTRAVENOUS
Status: DISCONTINUED | OUTPATIENT
Start: 2020-06-17 | End: 2020-06-17 | Stop reason: HOSPADM

## 2020-06-17 RX ORDER — OXYTOCIN/RINGER'S LACTATE 20/1000 ML
PLASTIC BAG, INJECTION (ML) INTRAVENOUS
Status: COMPLETED
Start: 2020-06-17 | End: 2020-06-17

## 2020-06-17 RX ORDER — CLINDAMYCIN PHOSPHATE 600 MG/50ML
600 INJECTION INTRAVENOUS EVERY 8 HOURS
Status: COMPLETED | OUTPATIENT
Start: 2020-06-17 | End: 2020-06-18

## 2020-06-17 RX ORDER — OXYCODONE AND ACETAMINOPHEN 5; 325 MG/1; MG/1
1 TABLET ORAL
Status: DISCONTINUED | OUTPATIENT
Start: 2020-06-17 | End: 2020-06-20 | Stop reason: HOSPADM

## 2020-06-17 RX ADMIN — CLINDAMYCIN IN 5 PERCENT DEXTROSE 600 MG: 12 INJECTION, SOLUTION INTRAVENOUS at 23:06

## 2020-06-17 RX ADMIN — SODIUM CHLORIDE, SODIUM LACTATE, POTASSIUM CHLORIDE, AND CALCIUM CHLORIDE 150 ML/HR: 600; 310; 30; 20 INJECTION, SOLUTION INTRAVENOUS at 22:50

## 2020-06-17 RX ADMIN — DEXAMETHASONE SODIUM PHOSPHATE 4 MG: 4 INJECTION, SOLUTION INTRAMUSCULAR; INTRAVENOUS at 21:01

## 2020-06-17 RX ADMIN — GENTAMICIN SULFATE 300 MG: 40 INJECTION, SOLUTION INTRAMUSCULAR; INTRAVENOUS at 20:53

## 2020-06-17 RX ADMIN — FENTANYL CITRATE 50 MCG: 50 INJECTION, SOLUTION INTRAMUSCULAR; INTRAVENOUS at 21:30

## 2020-06-17 RX ADMIN — BUPIVACAINE HYDROCHLORIDE 15 ML: 2.5 INJECTION, SOLUTION EPIDURAL; INFILTRATION; INTRACAUDAL; PERINEURAL at 09:26

## 2020-06-17 RX ADMIN — SODIUM CHLORIDE, SODIUM LACTATE, POTASSIUM CHLORIDE, AND CALCIUM CHLORIDE 125 ML/HR: 600; 310; 30; 20 INJECTION, SOLUTION INTRAVENOUS at 12:24

## 2020-06-17 RX ADMIN — LIDOCAINE HYDROCHLORIDE 2 ML: 10; .005 INJECTION, SOLUTION EPIDURAL; INFILTRATION; INTRACAUDAL; PERINEURAL at 20:53

## 2020-06-17 RX ADMIN — ONDANSETRON HYDROCHLORIDE 4 MG: 2 INJECTION, SOLUTION INTRAMUSCULAR; INTRAVENOUS at 20:29

## 2020-06-17 RX ADMIN — AZITHROMYCIN MONOHYDRATE 500 MG: 500 INJECTION, POWDER, LYOPHILIZED, FOR SOLUTION INTRAVENOUS at 20:04

## 2020-06-17 RX ADMIN — PHENYLEPHRINE HYDROCHLORIDE 10 MCG/MIN: 10 INJECTION INTRAVENOUS at 20:16

## 2020-06-17 RX ADMIN — ONDANSETRON 4 MG: 2 INJECTION INTRAMUSCULAR; INTRAVENOUS at 06:44

## 2020-06-17 RX ADMIN — FENTANYL CITRATE 50 MCG: 50 INJECTION, SOLUTION INTRAMUSCULAR; INTRAVENOUS at 21:24

## 2020-06-17 RX ADMIN — CEFAZOLIN SODIUM 2 G: 300 INJECTION, POWDER, LYOPHILIZED, FOR SOLUTION INTRAVENOUS at 20:00

## 2020-06-17 RX ADMIN — Medication 999 ML/HR: at 20:53

## 2020-06-17 RX ADMIN — SODIUM CHLORIDE 12.5 MG: 900 INJECTION, SOLUTION INTRAVENOUS at 07:06

## 2020-06-17 RX ADMIN — MORPHINE SULFATE 2 MG: 2 INJECTION, SOLUTION INTRAMUSCULAR; INTRAVENOUS at 21:02

## 2020-06-17 RX ADMIN — LIDOCAINE HYDROCHLORIDE 2 ML: 10; .005 INJECTION, SOLUTION EPIDURAL; INFILTRATION; INTRACAUDAL; PERINEURAL at 21:26

## 2020-06-17 RX ADMIN — LIDOCAINE HYDROCHLORIDE 2 ML: 10; .005 INJECTION, SOLUTION EPIDURAL; INFILTRATION; INTRACAUDAL; PERINEURAL at 21:25

## 2020-06-17 RX ADMIN — SODIUM CHLORIDE, SODIUM LACTATE, POTASSIUM CHLORIDE, AND CALCIUM CHLORIDE 500 ML: 600; 310; 30; 20 INJECTION, SOLUTION INTRAVENOUS at 18:04

## 2020-06-17 RX ADMIN — SODIUM CHLORIDE, SODIUM LACTATE, POTASSIUM CHLORIDE, AND CALCIUM CHLORIDE 125 ML/HR: 600; 310; 30; 20 INJECTION, SOLUTION INTRAVENOUS at 18:28

## 2020-06-17 RX ADMIN — FENTANYL CITRATE 100 MCG: 50 INJECTION, SOLUTION INTRAMUSCULAR; INTRAVENOUS at 09:26

## 2020-06-17 RX ADMIN — SODIUM CHLORIDE, SODIUM LACTATE, POTASSIUM CHLORIDE, AND CALCIUM CHLORIDE 125 ML/HR: 600; 310; 30; 20 INJECTION, SOLUTION INTRAVENOUS at 01:07

## 2020-06-17 RX ADMIN — Medication 5 MG: at 20:56

## 2020-06-17 RX ADMIN — BUTORPHANOL TARTRATE 1 MG: 1 INJECTION, SOLUTION INTRAMUSCULAR; INTRAVENOUS at 07:26

## 2020-06-17 RX ADMIN — OXYTOCIN 1 MILLI-UNITS/MIN: 10 INJECTION, SOLUTION INTRAMUSCULAR; INTRAVENOUS at 01:08

## 2020-06-17 RX ADMIN — ACETAMINOPHEN 1000 MG: 10 INJECTION, SOLUTION INTRAVENOUS at 20:36

## 2020-06-17 RX ADMIN — Medication 10 ML/HR: at 09:35

## 2020-06-17 RX ADMIN — LABETALOL HYDROCHLORIDE 100 MG: 100 TABLET, FILM COATED ORAL at 10:18

## 2020-06-17 RX ADMIN — LIDOCAINE HYDROCHLORIDE 14 ML: 10; .005 INJECTION, SOLUTION EPIDURAL; INFILTRATION; INTRACAUDAL; PERINEURAL at 19:56

## 2020-06-17 RX ADMIN — LIDOCAINE HYDROCHLORIDE,EPINEPHRINE BITARTRATE 1.5 ML: 15; .005 INJECTION, SOLUTION EPIDURAL; INFILTRATION; INTRACAUDAL; PERINEURAL at 09:26

## 2020-06-17 RX ADMIN — Medication 10 ML/HR: at 17:19

## 2020-06-17 RX ADMIN — Medication 5 MG: at 20:55

## 2020-06-17 RX ADMIN — MORPHINE SULFATE 3 MG: 0.5 INJECTION, SOLUTION EPIDURAL; INTRATHECAL; INTRAVENOUS at 20:53

## 2020-06-17 RX ADMIN — OXYTOCIN 20 UNITS: 10 INJECTION INTRAVENOUS at 20:57

## 2020-06-17 RX ADMIN — SODIUM CHLORIDE, SODIUM LACTATE, POTASSIUM CHLORIDE, AND CALCIUM CHLORIDE 999 ML/HR: 600; 310; 30; 20 INJECTION, SOLUTION INTRAVENOUS at 09:00

## 2020-06-17 NOTE — PROGRESS NOTES
Doing well. Now comfortable with epidural.  Denies HA, visual changes, RUQ discomfort. Active FM.     Visit Vitals  /71   Pulse 80   Temp 97.8 °F (36.6 °C)   Resp 16   Ht 5' 6\" (1.676 m)   Wt 109.8 kg (242 lb)   SpO2 93%   Breastfeeding No   BMI 39.06 kg/m²     Cervix: 7PF/75%/-9, cephalic, forebag -> ruptured with clear return of fluid  EFW 9#  Category 1 fetal heart tracing  [place]: difficult to  however appearing to be every 3-5 minutes     37 0/7 IOL for Pre E w/out severe features  - continue to allow labor to progress, making change  - continue fetal monitoring  - continue to monitor BPs and si/sx of severe features of pre E, none currently  - recheck cervix in 2 hours or prn  - GBS neg  - COVID neg    Evans Lopez MD

## 2020-06-17 NOTE — PROGRESS NOTES
1730  Lip (reduced easily)/C/+1  Category 1 fetal heart tracing  Will begin to push  Anticipating large baby given EFW 9+#  Mother and father understand risks of shoulder dystocia and desire to proceed with attempt at vaginal delivery    Dionicio Jennings MD

## 2020-06-17 NOTE — PROGRESS NOTES
1935: Bedside shift change report given to MAGGIE Gonzalez RN (oncoming nurse) by Vicente Rios RN (offgoing nurse). Report included the following information SBAR.   2019: pt feels like cook balloon has shifted, cook balloon came out with gentle traction by RN  6122: Bedside shift change report given to VIJAY Mcwilliams RN (oncoming nurse) by MAGGIE Gonzalez RN (offgoing nurse). Report included the following information SBAR.

## 2020-06-17 NOTE — ANESTHESIA PREPROCEDURE EVALUATION
Relevant Problems   No relevant active problems       Anesthetic History   No history of anesthetic complications            Review of Systems / Medical History  Patient summary reviewed, nursing notes reviewed and pertinent labs reviewed    Pulmonary  Within defined limits                 Neuro/Psych   Within defined limits           Cardiovascular    Hypertension: well controlled                   GI/Hepatic/Renal                Endo/Other        Morbid obesity     Other Findings   Comments: term           Physical Exam    Airway  Mallampati: II           Cardiovascular    Rhythm: regular           Dental         Pulmonary                 Abdominal         Other Findings            Anesthetic Plan    ASA: 2  Anesthesia type: epidural          Induction: Intravenous  Anesthetic plan and risks discussed with: Patient

## 2020-06-17 NOTE — PROGRESS NOTES
Patient has been pushing for 2 hours with no progression in station and good maternal effort. She now has a fever of 100.9. She states that she is fatigued, hot and losing effort. Given no progression in station despite good maternal effort, chorioamnionitis and maternal fatigue with known LGA baby will proceed with  section. This was discussed with patient and her  and she agrees with this plan. Will start Amp/gent for chorio. Azith and ancef prior to OR.       Sigrid Lisa MD

## 2020-06-17 NOTE — PROGRESS NOTES
Labor Progress Note    S: Patient seen, fetal heart rate and contraction pattern evaluated. Resting in bed. Feeling some mild cramping. Denies h/a, changes in vision, RUQ/epigastric pain.     Physical Exam:  Patient Vitals for the past 4 hrs:   Pulse BP   20 1900 77 126/83         Cervical Exam: deferred, cook balloon in place  Membranes:  Intact  Uterine Contractions:  Frequency: Irregular q6-12 minutes, mild  Fetal Heart Rate: Reactive, 130s, +accels, neg decels, moderate variability      Assessment/Plan:    38 y.o.  at 36w6d IUP  IOL preeclampsia without severe features  LGA- EFW 20 >99% 7lb 1oz  IVF pregnancy  Cat 1 tracing  GBS negative  COVID negative on     P:  Assumed care of patient  Introduced self to patient  Continue present management  All questions asked/answered and agrees with plan    Shan Villegas CNM

## 2020-06-17 NOTE — ANESTHESIA PROCEDURE NOTES
Epidural Block    Start time: 6/17/2020 9:10 AM  End time: 6/17/2020 9:28 AM  Performed by: Chris Rodgers MD  Authorized by: Chris Rodgers MD     Pre-Procedure  Indication: labor epidural    Preanesthetic Checklist: patient identified, risks and benefits discussed, anesthesia consent, site marked, patient being monitored, timeout performed and anesthesia consent    Timeout Time: 09:10        Epidural:   Patient position:  Seated  Prep region:  Lumbar  Prep: DuraPrep    Location:  L2-3    Needle and Epidural Catheter:   Needle Type:  Tuohy  Needle Gauge:  19 G  Injection Technique:  Loss of resistance using air and loss of resistance using saline  Attempts:  1  Catheter at Skin Depth (cm):  11  Depth in Epidural Space (cm):  5  Events: no blood with aspiration, no cerebrospinal fluid with aspiration, no paresthesia and negative aspiration test    Test Dose:  Negative    Assessment:   Catheter Secured:  Tegaderm and tape  Insertion:  Uncomplicated  Patient tolerance:  Patient tolerated the procedure well with no immediate complications

## 2020-06-17 NOTE — PROGRESS NOTES
1500:   Doing well. Active FM. No complaints. Comfortable with epidural.  Feeling rectal pressure.     Visit Vitals  /72 (BP 1 Location: Left arm, BP Patient Position: Lying right side)   Pulse 89   Temp 98.8 °F (37.1 °C)   Resp 20   Ht 5' 6\" (1.676 m)   Wt 109.8 kg (242 lb)   SpO2 100%   Breastfeeding No   BMI 39.06 kg/m²     Cervix: 8SV/23%/4, cephalic, KRISTIE, ruptured, mild moulding, edematous anterior cervix  EFW 9.5#  Category 1 fetal heart tracing  Dorris: every 2-4 though difficult to ascertain 2/2 habitus     37 0/7 IOL Pre E w/out severe features  - IUPC placed without difficulty given difficult to monitor contractions - will titrate pitocin to adequacy  - GBS neg  - COVID neg    Joanna Wise MD

## 2020-06-17 NOTE — PROGRESS NOTES
1548 - Bedside and Verbal shift change report given to NADEEM Hall RNC (oncoming nurse) by CARLA Grant RNC (offgoing nurse). Report included the following information SBAR, Kardex, Procedure Summary, Intake/Output, MAR, Accordion, Recent Results and Med Rec Status. 1550 - Trendelenberg position. 0040 - TRANSFER - OUT REPORT:    Verbal report given to Hiral ACOSTA(name) on Jones Inman  being transferred to [facility] Rm 330(unit) for routine progression of care       Report consisted of patients Situation, Background, Assessment and   Recommendations(SBAR). Information from the following report(s) SBAR, Kardex, OR Summary, Intake/Output, MAR, Accordion, Recent Results and Med Rec Status was reviewed with the receiving nurse. Lines:   Peripheral IV 20 Anterior; Left Wrist (Active)        Opportunity for questions and clarification was provided. Patient transported with:   Registered Nurse, accompanied by  and [place] (pushed in open crib by Jeannine Lyons RN).

## 2020-06-17 NOTE — PROGRESS NOTES
6082 Bedside, Verbal and Written shift change report given to CARLA Auguste RN (oncoming nurse) by TU Gonzalez RN (offgoing nurse). Report included the following information SBAR, Intake/Output, MAR, Accordion, Recent Results, Med Rec Status and Alarm Parameters . [id number] unable to continuosly monitor FHR d/t patient positioning and maternal size, at bedside attempting readjustment (except when patient in the bathroom). 0845 patient called out to go to the bathroom     [phone] patient stated I feel like fluid is coming out, SROM noted. 2410 Dr. Bishop Ebbs at bedside for epidural placement. 0915 bands off for epidural placement. 0931 bands reapplied    36 Dr. Vanda Spence at bedside for [employer] [address].    1453 Dr. Vanda Spence at bedside for SVE    1456 IUPC placed    1550 Bedside, Verbal and Written shift change report given to NADEEM Herrera RN (oncoming nurse) by CARLA Auguste RN (offgoing nurse). Report included the following information SBAR, Kardex, Intake/Output, MAR, Accordion, Recent Results, Med Rec Status and Alarm Parameters .

## 2020-06-18 LAB
BASOPHILS # BLD: 0 K/UL (ref 0–0.1)
BASOPHILS NFR BLD: 0 % (ref 0–1)
DIFFERENTIAL METHOD BLD: ABNORMAL
EOSINOPHIL # BLD: 0 K/UL (ref 0–0.4)
EOSINOPHIL NFR BLD: 0 % (ref 0–7)
ERYTHROCYTE [DISTWIDTH] IN BLOOD BY AUTOMATED COUNT: 17.8 % (ref 11.5–14.5)
HCT VFR BLD AUTO: 27.5 % (ref 35–47)
HGB BLD-MCNC: 8.7 G/DL (ref 11.5–16)
IMM GRANULOCYTES # BLD AUTO: 0.2 K/UL (ref 0–0.04)
IMM GRANULOCYTES NFR BLD AUTO: 1 % (ref 0–0.5)
LYMPHOCYTES # BLD: 1 K/UL (ref 0.8–3.5)
LYMPHOCYTES NFR BLD: 4 % (ref 12–49)
MCH RBC QN AUTO: 24.8 PG (ref 26–34)
MCHC RBC AUTO-ENTMCNC: 31.6 G/DL (ref 30–36.5)
MCV RBC AUTO: 78.3 FL (ref 80–99)
MONOCYTES # BLD: 1.2 K/UL (ref 0–1)
MONOCYTES NFR BLD: 5 % (ref 5–13)
NEUTS SEG # BLD: 21.5 K/UL (ref 1.8–8)
NEUTS SEG NFR BLD: 90 % (ref 32–75)
NRBC # BLD: 0 K/UL (ref 0–0.01)
NRBC BLD-RTO: 0 PER 100 WBC
PLATELET # BLD AUTO: 161 K/UL (ref 150–400)
PMV BLD AUTO: 12.4 FL (ref 8.9–12.9)
RBC # BLD AUTO: 3.51 M/UL (ref 3.8–5.2)
RBC MORPH BLD: ABNORMAL
WBC # BLD AUTO: 23.9 K/UL (ref 3.6–11)

## 2020-06-18 PROCEDURE — 65410000002 HC RM PRIVATE OB

## 2020-06-18 PROCEDURE — 74011250637 HC RX REV CODE- 250/637: Performed by: OBSTETRICS & GYNECOLOGY

## 2020-06-18 PROCEDURE — 74011000258 HC RX REV CODE- 258: Performed by: OBSTETRICS & GYNECOLOGY

## 2020-06-18 PROCEDURE — 74011250636 HC RX REV CODE- 250/636: Performed by: ANESTHESIOLOGY

## 2020-06-18 PROCEDURE — 36415 COLL VENOUS BLD VENIPUNCTURE: CPT

## 2020-06-18 PROCEDURE — 74011250636 HC RX REV CODE- 250/636: Performed by: OBSTETRICS & GYNECOLOGY

## 2020-06-18 PROCEDURE — 85025 COMPLETE CBC W/AUTO DIFF WBC: CPT

## 2020-06-18 PROCEDURE — 74011250636 HC RX REV CODE- 250/636

## 2020-06-18 RX ORDER — ONDANSETRON 2 MG/ML
4 INJECTION INTRAMUSCULAR; INTRAVENOUS
Status: COMPLETED | OUTPATIENT
Start: 2020-06-18 | End: 2020-06-18

## 2020-06-18 RX ORDER — KETOROLAC TROMETHAMINE 30 MG/ML
INJECTION, SOLUTION INTRAMUSCULAR; INTRAVENOUS
Status: COMPLETED
Start: 2020-06-18 | End: 2020-06-18

## 2020-06-18 RX ORDER — DIPHENHYDRAMINE HYDROCHLORIDE 50 MG/ML
25 INJECTION, SOLUTION INTRAMUSCULAR; INTRAVENOUS
Status: DISCONTINUED | OUTPATIENT
Start: 2020-06-18 | End: 2020-06-20 | Stop reason: HOSPADM

## 2020-06-18 RX ORDER — ACETAMINOPHEN 10 MG/ML
1000 INJECTION, SOLUTION INTRAVENOUS
Status: ACTIVE | OUTPATIENT
Start: 2020-06-18 | End: 2020-06-19

## 2020-06-18 RX ORDER — KETOROLAC TROMETHAMINE 30 MG/ML
30 INJECTION, SOLUTION INTRAMUSCULAR; INTRAVENOUS
Status: COMPLETED | OUTPATIENT
Start: 2020-06-18 | End: 2020-06-18

## 2020-06-18 RX ORDER — LANOLIN ALCOHOL/MO/W.PET/CERES
1 CREAM (GRAM) TOPICAL 2 TIMES DAILY WITH MEALS
Status: DISCONTINUED | OUTPATIENT
Start: 2020-06-18 | End: 2020-06-20 | Stop reason: HOSPADM

## 2020-06-18 RX ORDER — MORPHINE SULFATE 2 MG/ML
2 INJECTION, SOLUTION INTRAMUSCULAR; INTRAVENOUS
Status: ACTIVE | OUTPATIENT
Start: 2020-06-18 | End: 2020-06-19

## 2020-06-18 RX ORDER — NALOXONE HYDROCHLORIDE 0.4 MG/ML
0.2 INJECTION, SOLUTION INTRAMUSCULAR; INTRAVENOUS; SUBCUTANEOUS
Status: ACTIVE | OUTPATIENT
Start: 2020-06-18 | End: 2020-06-19

## 2020-06-18 RX ORDER — KETOROLAC TROMETHAMINE 30 MG/ML
30 INJECTION, SOLUTION INTRAMUSCULAR; INTRAVENOUS
Status: DISPENSED | OUTPATIENT
Start: 2020-06-18 | End: 2020-06-19

## 2020-06-18 RX ORDER — MORPHINE SULFATE 10 MG/ML
5 INJECTION, SOLUTION INTRAMUSCULAR; INTRAVENOUS
Status: DISPENSED | OUTPATIENT
Start: 2020-06-18 | End: 2020-06-19

## 2020-06-18 RX ADMIN — FERROUS SULFATE TAB 325 MG (65 MG ELEMENTAL FE) 325 MG: 325 (65 FE) TAB at 13:36

## 2020-06-18 RX ADMIN — OXYCODONE HYDROCHLORIDE AND ACETAMINOPHEN 1 TABLET: 5; 325 TABLET ORAL at 14:38

## 2020-06-18 RX ADMIN — CLINDAMYCIN IN 5 PERCENT DEXTROSE 600 MG: 12 INJECTION, SOLUTION INTRAVENOUS at 06:59

## 2020-06-18 RX ADMIN — OXYCODONE HYDROCHLORIDE AND ACETAMINOPHEN 1 TABLET: 5; 325 TABLET ORAL at 20:20

## 2020-06-18 RX ADMIN — KETOROLAC TROMETHAMINE 30 MG: 30 INJECTION, SOLUTION INTRAMUSCULAR at 00:17

## 2020-06-18 RX ADMIN — ONDANSETRON 4 MG: 2 INJECTION INTRAMUSCULAR; INTRAVENOUS at 01:16

## 2020-06-18 RX ADMIN — ONDANSETRON 4 MG: 2 INJECTION INTRAMUSCULAR; INTRAVENOUS at 09:15

## 2020-06-18 RX ADMIN — AMPICILLIN SODIUM 2 G: 2 INJECTION, POWDER, FOR SOLUTION INTRAMUSCULAR; INTRAVENOUS at 13:41

## 2020-06-18 RX ADMIN — KETOROLAC TROMETHAMINE 30 MG: 30 INJECTION, SOLUTION INTRAMUSCULAR; INTRAVENOUS at 00:17

## 2020-06-18 RX ADMIN — DIPHENHYDRAMINE HYDROCHLORIDE 12.5 MG: 50 INJECTION, SOLUTION INTRAMUSCULAR; INTRAVENOUS at 09:13

## 2020-06-18 RX ADMIN — CLINDAMYCIN IN 5 PERCENT DEXTROSE 600 MG: 12 INJECTION, SOLUTION INTRAVENOUS at 14:48

## 2020-06-18 RX ADMIN — MORPHINE SULFATE 5 MG: 10 INJECTION INTRAVENOUS at 09:16

## 2020-06-18 RX ADMIN — KETOROLAC TROMETHAMINE 30 MG: 30 INJECTION, SOLUTION INTRAMUSCULAR at 18:43

## 2020-06-18 RX ADMIN — DOCUSATE SODIUM 100 MG: 100 CAPSULE, LIQUID FILLED ORAL at 13:35

## 2020-06-18 RX ADMIN — AMPICILLIN SODIUM 2 G: 2 INJECTION, POWDER, FOR SOLUTION INTRAMUSCULAR; INTRAVENOUS at 02:23

## 2020-06-18 RX ADMIN — SODIUM CHLORIDE, SODIUM LACTATE, POTASSIUM CHLORIDE, AND CALCIUM CHLORIDE 150 ML/HR: 600; 310; 30; 20 INJECTION, SOLUTION INTRAVENOUS at 06:09

## 2020-06-18 RX ADMIN — AMPICILLIN SODIUM 2 G: 2 INJECTION, POWDER, FOR SOLUTION INTRAMUSCULAR; INTRAVENOUS at 08:47

## 2020-06-18 RX ADMIN — ONDANSETRON 4 MG: 2 INJECTION INTRAMUSCULAR; INTRAVENOUS at 18:43

## 2020-06-18 RX ADMIN — KETOROLAC TROMETHAMINE 30 MG: 30 INJECTION, SOLUTION INTRAMUSCULAR at 06:16

## 2020-06-18 RX ADMIN — KETOROLAC TROMETHAMINE 30 MG: 30 INJECTION, SOLUTION INTRAMUSCULAR at 12:11

## 2020-06-18 NOTE — OP NOTES
Operative Note    Name: Zac Montez   Medical Record Number: [id number]   YOB: 1985  Today's Date: 2020      Pre-operative Diagnosis:  1. Arrest of Descent  2. Chorioamnionitis  3. Pre eclampsia w/out severe features    Post-operative Diagnosis:  Same as above    Operation: low transverse  section Procedure(s):   SECTION    Surgeon(s):  Gregorio Ruiz MD     Assist:  Kayla Bell    Anesthesia: Epidural    Prophylactic Antibiotics: ampicillin and gentamicin, ancef, azithromycin  DVT Prophylaxis: Sequential Compression Devices         Fetal Description: penn     Birth Information:   Information for the patient's :  Ele Walker [[id number]]   Delivery of a   male infant on 2020 at 8:47 PM. Apgars were 3  and 6 . Umbilical Cord: 3 Vessels     Umbilical Cord Events: None     Placenta: Manual Removal removal with Normal appearance. Amniotic Fluid Volume:  Large     Amniotic Fluid Description:  Clear          Placenta:  manual removal   Estimated Blood Loss (ml):   QBL pending    Specimens: Placenta           Complications:  Baby's head difficult to elevate out of pelvis due to tight suction, required mole and additional MD for elevation of baby into pelvis    Procedure Detail:      After proper patient identification and consent, the patient was taken to the operating room, where spinal anesthesia was administered and found to be adequate. Barrow catheter had been placed using sterile technique. The patient was prepped and draped in the normal sterile fashion. The abdomen was entered using the Pfannenstiel technique. The peritoneum was entered sharply well superior to the bladder without any apparent injury. The bladder flap was created. A low transverse uterine incision was made with the scalpel  and extended with blunt finger dissection. Amniotomy was performed and the fluid was medium amount clear.   The babys head was difficult to elevate out of the pelvis as it was deeply seated and did not elevate easily. Nicho called for and some improvement in elevation however still unable to break suction. Called for second MD for assistance and with the help of Dr. Matt Neville the baby was elevated out of the pelvis then delivered atraumatically. The cord was clamped and cut and the baby was handed off to  staff in attendance. Placenta was then removed from the uterus. The uterus was curettaged with a moist lap pad and cleared of all clots and debris. The uterine incision was closed with 0 monocryl, double layer  in running locking fashion with good hemostasis assured. Good hemostasis was again reassured. The rectus muscles were reapproximated with 2-0 vicryl after reapproximation of the peritoneum. Rebecca was applied over muscle. The fascia was closed with 0 Vicryl in a running fashion. Good hemostasis was assured. The subcuticular tissue was irrigated and hemostasis was achieved with a bovie. The skin was closed with an ensorb staple closure. The patient tolerated the procedure well. Sponge, lap, and needle counts were correct times three and the patient and baby were taken to recovery/postpartum room in stable condition.     Cody Diamond MD  2020  9:52 PM

## 2020-06-18 NOTE — PROGRESS NOTES
Post-Operative  Day 1    Duglas Lao     Assessment: Post-Op day 1, stable      Plan:   1. Routine post-operative care   2. The risks and benefits of the circumcision  procedure and anesthesia including: bleeding, infection, variability of cosmetic results were discussed at length with the mother. She is aware that future repeat procedures may be necessary. She gives informed consent to proceed as noted and her questions are answered. Circ held per baby grunting and needing possible respiratory support. Will reassess this evening. 3. Suspected triple I- s/p amg/gent/clinda, currently afebrile  4. Preeclampsia w/o severe features- normotensive on labetalol 100 mg bid  5. Acute blood loss anemia: 11.5> 8.7, asymptomatic, will start Fe    Information for the patient's :  Neva Gross [[id number]]   , Low Transverse   Patient doing well without significant complaint. Nausea and vomiting resolved, tolerating liquids, no flatus, hunter has been removed but pt has not voided yet. Vitals:  Visit Vitals  /77 (BP 1 Location: Right arm, BP Patient Position: At rest)   Pulse 86   Temp 97.8 °F (36.6 °C)   Resp 18   Ht 5' 6\" (1.676 m)   Wt 109.8 kg (242 lb)   SpO2 98%   Breastfeeding Unknown   BMI 39.06 kg/m²     Temp (24hrs), Av °F (37.2 °C), Min:97.5 °F (36.4 °C), Max:101.6 °F (38.7 °C)      Last 24hr Input/Output:    Intake/Output Summary (Last 24 hours) at 2020 0845  Last data filed at 2020 0515  Gross per 24 hour   Intake 4283.75 ml   Output 3445 ml   Net 838.75 ml          Exam:        Patient without distress. Lungs clear. Abdomen, bowel sounds present, soft, expected tenderness, fundus firm Wound dressing intact     Perineum normal lochia noted               Lower extremities are negative for swelling, cords or tenderness.     Labs:   Lab Results   Component Value Date/Time    WBC 23.9 (H) 2020 05:16 AM    WBC 11.6 (H) 2020 05:15 PM    WBC 13.3 (H) 05/23/2020 05:15 PM    WBC 11.8 (H) 05/22/2020 11:40 AM    HGB 8.7 (L) 06/18/2020 05:16 AM    HGB 11.5 06/16/2020 05:15 PM    HGB 10.0 (L) 05/23/2020 05:15 PM    HGB 11.1 (L) 05/22/2020 11:40 AM    HCT 27.5 (L) 06/18/2020 05:16 AM    HCT 35.6 06/16/2020 05:15 PM    HCT 31.7 (L) 05/23/2020 05:15 PM    HCT 35.1 05/22/2020 11:40 AM    PLATELET 613 67/41/4781 05:16 AM    PLATELET 380 23/30/2444 05:15 PM    PLATELET 676 96/91/2552 05:15 PM    PLATELET 883 82/28/7442 11:40 AM       Recent Results (from the past 24 hour(s))   CBC WITH AUTOMATED DIFF    Collection Time: 06/18/20  5:16 AM   Result Value Ref Range    WBC 23.9 (H) 3.6 - 11.0 K/uL    RBC 3.51 (L) 3.80 - 5.20 M/uL    HGB 8.7 (L) 11.5 - 16.0 g/dL    HCT 27.5 (L) 35.0 - 47.0 %    MCV 78.3 (L) 80.0 - 99.0 FL    MCH 24.8 (L) 26.0 - 34.0 PG    MCHC 31.6 30.0 - 36.5 g/dL    RDW 17.8 (H) 11.5 - 14.5 %    PLATELET 201 766 - 954 K/uL    MPV 12.4 8.9 - 12.9 FL    NRBC 0.0 0  WBC    ABSOLUTE NRBC 0.00 0.00 - 0.01 K/uL    NEUTROPHILS 90 (H) 32 - 75 %    LYMPHOCYTES 4 (L) 12 - 49 %    MONOCYTES 5 5 - 13 %    EOSINOPHILS 0 0 - 7 %    BASOPHILS 0 0 - 1 %    IMMATURE GRANULOCYTES 1 (H) 0.0 - 0.5 %    ABS. NEUTROPHILS 21.5 (H) 1.8 - 8.0 K/UL    ABS. LYMPHOCYTES 1.0 0.8 - 3.5 K/UL    ABS. MONOCYTES 1.2 (H) 0.0 - 1.0 K/UL    ABS. EOSINOPHILS 0.0 0.0 - 0.4 K/UL    ABS. BASOPHILS 0.0 0.0 - 0.1 K/UL    ABS. IMM.  GRANS. 0.2 (H) 0.00 - 0.04 K/UL    DF SMEAR SCANNED      RBC COMMENTS ANISOCYTOSIS  1+        RBC COMMENTS BASOPHILIC STIPPLING  PRESENT        RBC COMMENTS OVALOCYTES  PRESENT        RBC COMMENTS POLYCHROMASIA  1+

## 2020-06-18 NOTE — ADDENDUM NOTE
Addendum  created 06/18/20 0719 by Sophy Pinzon MD    Attestation recorded in [address] [place] [address], [facility] 97 filed

## 2020-06-18 NOTE — ROUTINE PROCESS
Bedside and Verbal shift change report given to ELIF Zimmerman RN (oncoming nurse) by Jose Daniel Alonso. Angela Mcdaniels (offgoing nurse). Report included the following information SBAR.  
0930  Pt c/o nausea, itching and pain. Medicated with Zofran 4mg IV, Benadryl 12.5 mg IV and Morphine 5 mg IV per MD orders.

## 2020-06-18 NOTE — PROGRESS NOTES
Anesthesia Post Operative Day 1      The patient is status post C Section with epidural anesthesia and Duramorph for pain. The patient relates the following scales: pain,mild ; itching, mild ; nausea, mild. All sympyoms were treated with medications per protocol. The patient is up and ambulating and has minimal complaints. Plan: Continue to treat breakthrough symptoms as needed with protocol medictions.

## 2020-06-18 NOTE — ANESTHESIA POSTPROCEDURE EVALUATION
Procedure(s):   SECTION. epidural    Anesthesia Post Evaluation      Multimodal analgesia: multimodal analgesia used between 6 hours prior to anesthesia start to PACU discharge  Patient location during evaluation: bedside  Patient participation: waiting for patient participation  Level of consciousness: awake  Pain management: adequate  Airway patency: patent  Anesthetic complications: no  Cardiovascular status: acceptable  Respiratory status: unassisted  Hydration status: acceptable  Comments: Post-Anesthesia Evaluation and Assessment    I have evaluated the patient and they are ready for PACU discharge. Patient: Grace Phoenix MRN: [id number]  SSN: [id number]   YOB: 1985  Age: 38 y.o. Sex: female      Cardiovascular Function/Vital Signs  /74   Pulse 95   Temp 37.3 °C (99.1 °F)   Resp 18   Ht 5' 6\" (1.676 m)   Wt 109.8 kg (242 lb)   SpO2 95%   Breastfeeding Unknown   BMI 39.06 kg/m²     Patient is status post Epidural anesthesia for Procedure(s):   SECTION. Nausea/Vomiting: None    Postoperative hydration reviewed and adequate. Pain:  Pain Scale 1: Numeric (0 - 10) (20)  Pain Intensity 1: 2 (20)   Managed    Neurological Status:   Neuro (WDL): Within Defined Limits (20)   At baseline    Mental Status, Level of Consciousness: Alert and  oriented to person, place, and time    Pulmonary Status:   O2 Device: Room air (20)   Adequate oxygenation and airway patent    Complications related to anesthesia: None    Post-anesthesia assessment completed. No concerns    Signed By: Chuck Patricio MD    2020                   INITIAL Post-op Vital signs:   Vitals Value Taken Time   /74 2020 10:19 PM   Temp     Pulse 95 2020 10:19 PM   Resp     SpO2 94 % 2020 10:23 PM   Vitals shown include unvalidated device data.

## 2020-06-18 NOTE — PROGRESS NOTES
TRANSFER - IN REPORT:    Verbal report received from Ramone Maya RN (name) on Elvira Thorne  being received from L&D (unit) for routine progression of care      Report consisted of patients Situation, Background, Assessment and   Recommendations(SBAR). Information from the following report(s) SBAR was reviewed with the receiving nurse. Opportunity for questions and clarification was provided. Assessment completed upon patients arrival to unit and care assumed.

## 2020-06-18 NOTE — LACTATION NOTE
This note was copied from a baby's chart. Initial consult of 37 weeks,   mother with history of IUI, being treated for suspected Chorio. Infant has been nursing well but has had intermittent grunting, and was started on IV antibiotics. Nurse reports that baby's grunting has resolved and that his feedings have resumed without issue. Mother has colostrum and baby latches easily. Mother taught how to use pillows to support, and how to latch baby in cradled biologic position. Mother and baby are quick learners. Mother told about potential risk factors for low supply given hormonal infertility history. At this time, there is no sign that lactation will be impacted, but if supply becomes questionable, pumping regimen may be initiated. Mother was happy with that decision, she did not want to start pumping at this time. Mother agrees to call for assistance as needed.

## 2020-06-18 NOTE — L&D DELIVERY NOTE
Delivery Summary  Patient: Zac Montez             Circumcision:   desires  Additional Delivery Comments - Primary  section for second stage arrest with difficulty with elevation and delivery of head taking additional time though ultimately with uncomplicated delivery of baby. See operative note for details.         Information for the patient's :  Ele Burrisdict [[id number]]       Labor Events:    Labor: No    Steroids:     Cervical Ripening Date/Time: 2020 6:00 PM   Cervical Ripening Type: Barrow/EASI   Antibiotics During Labor: No   Rupture Identifier: Sac 1    Rupture Date/Time: 2020 8:50 AM   Rupture Type: SROM   Amniotic Fluid Volume: Large    Amniotic Fluid Description: Clear    Amniotic Fluid Odor: None    Induction: Oxytocin       Induction Date/Time: 2020 1:08 AM    Indications for Induction: Mild Preeclampsia    Augmentation:     Augmentation Date/Time:      Indications for Augmentation:     Labor complications: Failure to Progress in Second Stage       Additional complications:        Delivery Events:  Indications For Episiotomy:     Episiotomy: None   Perineal Laceration(s): None   Repaired:     Periurethral Laceration Location:      Repaired:     Labial Laceration Location:     Repaired:     Sulcal Laceration Location:     Repaired:     Vaginal Laceration Location:     Repaired:     Cervical Laceration Location:     Repaired:     Repair Suture: None   Number of Repair Packets:     Estimated Blood Loss (ml):  ml   Quantitative Blood Loss (ml)                Delivery Date: 2020    Delivery Time: 8:47 PM  Delivery Type: , Low Transverse  Sex:  Male    Gestational Age: 37w0d   Delivery Clinician:  Rao Curtis  Living Status: Living   Delivery Location: L&D OR1          APGARS  One minute Five minutes Ten minutes   Skin color: 0   1   2     Heart rate: 1   2   2     Grimace: 1   1   2     Muscle tone: 1   1   1     Breathin   1   2 Totals: 3   6   9         Presentation: Vertex    Position:        Resuscitation Method:  Suctioning-bulb;Suctioning-deep; Tactile Stimulation;PPV     Meconium Stained: None      Cord Information: 3 Vessels  Complications: None  Cord around:    Delayed cord clamping? No  Cord clamped date/time:   Disposition of Cord Blood: Lab    Blood Gases Sent?: Yes    Placenta:  Date/Time: 2020  8:49 PM  Removal: Manual Removal      Appearance: Normal     [place] Measurements:  Birth Weight: 3.646 kg      Birth Length: 55.9 cm      Head Circumference: 35 cm      Chest Circumference:       Abdominal Girth: 34.5 cm    Other Providers:   JAMIE BRISCOE;ROMÁN MONTERO;GREY BERNSTEIN;HUGO BRYANT;MIRIAN ROBLERO;RACHEL LOWE;HARLAN TREJO;DONELL BERNARD;MATEO KHOURY JR;MAINOR MURRAY, Obstetrician;Primary Nurse;Primary [place] Nurse;Scrub Tech; Anesthesiologist;Crna;Surgeon Assistant;Nurse Practitioner;Respiratory Therapist;Nicu Nurse           Cord pH:  none    Episiotomy: None   Laceration(s): None     Estimated Blood Loss (ml): QBL pending    Labor Events  Method: Oxytocin      Augmentation:     Cervical Ripenin2020  6:00 PM  [facility] Problems  Date Reviewed: 2018          Codes Class Noted POA    Preeclampsia ICD-10-CM: O14.90  ICD-9-CM: 642.40  2020 Unknown              Operative Vaginal Delivery - none    Group B Strep:   Lab Results   Component Value Date/Time    GrBStrep, External negative 2020     Information for the patient's :  Eugene Norman [[id number]]   No results found for: ABORH, PCTABR, PCTDIG, BILI, ABORHEXT, ABORH    No results found for: APH, APCO2, APO2, AHCO3, ABEC, ABDC, O2ST, EPHV, PCO2V, PO2V, HCO3V, EBEV, EBDV, SITE, RSCOM

## 2020-06-18 NOTE — PROGRESS NOTES
Baby with great sats but was still grunting. Seen by peds hospitalist who started him on antibiotics due to mom having triple I. Circ deferred per peds rec today.

## 2020-06-19 PROCEDURE — 74011250637 HC RX REV CODE- 250/637: Performed by: OBSTETRICS & GYNECOLOGY

## 2020-06-19 PROCEDURE — 65410000002 HC RM PRIVATE OB

## 2020-06-19 RX ORDER — ONDANSETRON 4 MG/1
8 TABLET, ORALLY DISINTEGRATING ORAL
Status: DISCONTINUED | OUTPATIENT
Start: 2020-06-19 | End: 2020-06-20 | Stop reason: HOSPADM

## 2020-06-19 RX ORDER — OXYCODONE AND ACETAMINOPHEN 5; 325 MG/1; MG/1
1 TABLET ORAL
Qty: 16 TAB | Refills: 0 | Status: SHIPPED | OUTPATIENT
Start: 2020-06-19 | End: 2020-06-24

## 2020-06-19 RX ORDER — IBUPROFEN 600 MG/1
600 TABLET ORAL
Qty: 30 TAB | Refills: 1 | Status: SHIPPED | OUTPATIENT
Start: 2020-06-19

## 2020-06-19 RX ADMIN — IBUPROFEN 800 MG: 400 TABLET, FILM COATED ORAL at 09:20

## 2020-06-19 RX ADMIN — ONDANSETRON 8 MG: 4 TABLET, ORALLY DISINTEGRATING ORAL at 20:39

## 2020-06-19 RX ADMIN — IBUPROFEN 800 MG: 400 TABLET, FILM COATED ORAL at 20:48

## 2020-06-19 RX ADMIN — FERROUS SULFATE TAB 325 MG (65 MG ELEMENTAL FE) 325 MG: 325 (65 FE) TAB at 09:20

## 2020-06-19 RX ADMIN — OXYCODONE HYDROCHLORIDE AND ACETAMINOPHEN 1 TABLET: 5; 325 TABLET ORAL at 09:20

## 2020-06-19 RX ADMIN — DOCUSATE SODIUM 100 MG: 100 CAPSULE, LIQUID FILLED ORAL at 09:20

## 2020-06-19 RX ADMIN — LABETALOL HYDROCHLORIDE 100 MG: 100 TABLET, FILM COATED ORAL at 20:48

## 2020-06-19 RX ADMIN — LABETALOL HYDROCHLORIDE 100 MG: 100 TABLET, FILM COATED ORAL at 09:20

## 2020-06-19 RX ADMIN — IBUPROFEN 800 MG: 400 TABLET, FILM COATED ORAL at 01:01

## 2020-06-19 RX ADMIN — DOCUSATE SODIUM 100 MG: 100 CAPSULE, LIQUID FILLED ORAL at 20:48

## 2020-06-19 RX ADMIN — ONDANSETRON 8 MG: 4 TABLET, ORALLY DISINTEGRATING ORAL at 12:54

## 2020-06-19 RX ADMIN — OXYCODONE HYDROCHLORIDE AND ACETAMINOPHEN 1 TABLET: 5; 325 TABLET ORAL at 22:25

## 2020-06-19 RX ADMIN — LABETALOL HYDROCHLORIDE 100 MG: 100 TABLET, FILM COATED ORAL at 01:11

## 2020-06-19 RX ADMIN — OXYCODONE HYDROCHLORIDE AND ACETAMINOPHEN 1 TABLET: 5; 325 TABLET ORAL at 15:41

## 2020-06-19 RX ADMIN — FERROUS SULFATE TAB 325 MG (65 MG ELEMENTAL FE) 325 MG: 325 (65 FE) TAB at 20:48

## 2020-06-19 NOTE — LACTATION NOTE
This note was copied from a baby's chart. Infant has been fussy at breast, but nursing well when swaddled loosely. Assisted parents to latch infant in variations of prone/seated positions. Baby nursing well today,  deep latch obtained, mother is comfortable, baby feeding vigorously with rhythmic suck, swallow, breathe pattern, audible swallowing, and evident milk transfer, both breasts offered, baby is asleep following feeding. Discussed with parents: positioning and alternating positions, using pillows to support nursing couplet, characteristics deep v shallow latch, and feeding cues. Demonstrated breast massage and hand expression with drops of colostrum easily expressed. Encouraged mom to call for assistance if needed with latching. 1415 Infant is pushing nipple out of mouth and raising tongue to roof of mouth. Mom taught  Techniques to let infant suck on finger to flatten tongue. Mom inst in use of nipple shield. Assisted to latch infant using shield in seated football. Baby nursing well,  deep latch obtained, mother is comfortable, baby feeding vigorously with rhythmic suck, swallow, breathe pattern, audible swallowing, and evident milk transfer, both breasts offered, baby is asleep following feeding. Mom will use shield as needed and continue to use tongue training techniques.

## 2020-06-19 NOTE — PROGRESS NOTES
Post-Operative  Day 2    Zac Aas     Assessment: Post-Op day 2, doing well    Plan:   1. Routine post-operative care              2. Pt previously consented for circ for her son but now she is leaning towards deferring for the time being. She will let us know what she wants to do. 3. Suspected triple I- s/p amg/gent/clinda, currently afebrile  4. Preeclampsia w/o severe features- normotensive to mild range on labetalol 100 mg bid  5. Acute blood loss anemia: 11.5> 8.7, asymptomatic, will start Fe  6. Anticipate discharge home in AM and plan for 1 week BP check. Information for the patient's :  Ele Burrisdict [[id number]]   , Low Transverse   Patient doing well without significant complaint. Nausea and vomiting resolved, tolerating liquids, passing flatus, voiding and ambulating without difficulty. No HA, RUQ pain or visual changes. Vitals:  Visit Vitals  /90 (BP 1 Location: Right arm, BP Patient Position: At rest)   Pulse 92   Temp 98.4 °F (36.9 °C)   Resp 16   Ht 5' 6\" (1.676 m)   Wt 109.8 kg (242 lb)   SpO2 98%   Breastfeeding Unknown   BMI 39.06 kg/m²     Temp (24hrs), Av.5 °F (36.9 °C), Min:97.8 °F (36.6 °C), Max:99.3 °F (37.4 °C)        Exam:        Patient without distress. Abdomen, bowel sounds present, soft, expected tenderness, fundus firm                Wound incision clean, dry and intact               Lower extremities are negative for swelling, cords or tenderness.     Labs:   Lab Results   Component Value Date/Time    WBC 23.9 (H) 2020 05:16 AM    WBC 11.6 (H) 2020 05:15 PM    WBC 13.3 (H) 2020 05:15 PM    WBC 11.8 (H) 2020 11:40 AM    HGB 8.7 (L) 2020 05:16 AM    HGB 11.5 2020 05:15 PM    HGB 10.0 (L) 2020 05:15 PM    HGB 11.1 (L) 2020 11:40 AM    HCT 27.5 (L) 2020 05:16 AM    HCT 35.6 2020 05:15 PM    HCT 31.7 (L) 2020 05:15 PM    HCT 35.1 2020 11:40 AM PLATELET 666 94/22/7317 05:16 AM    PLATELET 450 13/40/3802 05:15 PM    PLATELET 408 93/60/7914 05:15 PM    PLATELET 351 69/01/3485 11:40 AM       No results found for this or any previous visit (from the past 24 hour(s)).

## 2020-06-19 NOTE — ROUTINE PROCESS
Bedside shift change report given to Mandeep Martinez RN (oncoming nurse) by [facility]. KARLA Flynn (offgoing nurse). Report included the following information SBAR, Kardex, Procedure Summary, Intake/Output, MAR and Recent Results.

## 2020-06-19 NOTE — DISCHARGE INSTRUCTIONS
Virtual postpartum support group meetings available at [email or web address]    POSTPARTUM DISCHARGE INSTRUCTIONS       Name:  Dilan Teague  YOB: 1985  Admission Diagnosis:  Preeclampsia [O14.90]     Discharge Diagnosis:    Problem List as of 2020 Date Reviewed: 2018          Codes Class Noted - Resolved    Preeclampsia ICD-10-CM: O14.90  ICD-9-CM: 642.40  2020 - Present        Pre-eclampsia during pregnancy in third trimester, antepartum ICD-10-CM: O14.93  ICD-9-CM: 642.43  2020 - Present        Obesity ICD-10-CM: E66.9  ICD-9-CM: 278.00  Unknown - Present        Pap smear for cervical cancer screening (Chronic) ICD-10-CM: Z12.4  ICD-9-CM: V76.2  2014 - Present    Overview Addendum 2014  2:28 PM by Lissette JAY     3/2013 normal Dr. Paez Prima             Gluten intolerance ICD-10-CM: K90.41  ICD-9-CM: 579.0  Unknown - Present        Eczema ICD-10-CM: L30.9  ICD-9-CM: 692.9  Unknown - Present        Anxiety ICD-10-CM: F41.9  ICD-9-CM: 300.00  Unknown - Present            Attending Physician:  Bienvenido Jeffries MD    Delivery Type:   Section: What to Expect at Home    Your Recovery:  A  section, or , is surgery to deliver your baby through a cut, called an incision that the doctor makes in your lower belly and uterus. You may have some pain in your lower belly and need pain medicine for 1 to 2 weeks. You can expect some vaginal bleeding for several weeks. You will probably need about 6 weeks to fully recover. It is important to take it easy while the incision is healing. Avoid heavy lifting, strenuous activities, or exercises that strain the belly muscles while you are recovering. Ask a family member or friend for help with housework, cooking, and shopping. This care sheet gives you a general idea about how long it will take for you to recover. But each person recovers at a different pace.  Follow the steps below to get better as quickly as possible. How can you care for yourself at home? Activity  · Rest when you feel tired. Getting enough sleep will help you recover. · Try to walk each day. Start by walking a little more than you did the day before. Bit by bit, increase the amount you walk. Walking boosts blood flow and helps prevent pneumonia, constipation, and blood clots. · Avoid strenuous activities, such as bicycle riding, jogging, weightlifting, and aerobic exercise, for 6 weeks or until your doctor says it is okay. · Until your doctor says it is okay, do not lift anything heavier than your baby. · Do not do sit-ups or other exercise that strain the belly muscles for 6 weeks or until your doctor says it is okay. · Hold a pillow over your incision when you cough or take deep breaths. This will support your belly and decrease your pain. · You may shower as usual. Pat the incision dry when you are done. · You will have some vaginal bleeding. Wear sanitary pads. Do not douche or use tampons until your doctor says it is okay. · Ask your doctor when you can drive again. · You will probably need to take at least 6 weeks off work. It depends on the type of work you do and how you feel. · Wait until you are healed (about 4 to 6 weeks) before you have sexual intercourse. Your doctor will tell you when it is okay to have sex. · Talk to your doctor about birth control. You can get pregnant even before your period returns. Also, you can get pregnant while you are breast-feeding. Incision care  Your skin is your body's first line of defense against germs, but an incision site leaves an easy way for germs to enter your body. To prevent infection:  · Clean your hands frequently and before and after changing any touching any dressings. · If you have strips of tape on the incision, leave the tape on for a week or until it falls off. · Look at your incision closely every day for any changes.  Contact your doctor if you experience any signs of infection, such as fever or increased redness at the surgical site. · Wash the area daily with warm, soapy water, and pat it dry. Don't use hydrogen peroxide or alcohol, which can slow healing. You may cover the area with a gauze bandage if it weeps or rubs against clothing. Change the bandage every day. · Keep the area clean and dry. Diet  · You can eat your normal diet. If your stomach is upset, try bland, low-fat foods like plain rice, broiled chicken, toast, and yogurt. · Drink plenty of fluids (unless your doctor tells you not to). · You may notice that your bowel movements are not regular right after your surgery. This is common. Try to avoid constipation and straining with bowel movements. You may want to take a fiber supplement every day. If you have not had a bowel movement after a couple of days, ask your doctor about taking a mild laxative. · If you are breast-feeding, do not drink any alcohol. Medicines  · Take pain medicines exactly as directed. · If the doctor gave you a prescription medicine for pain, take it as prescribed. · If you are not taking a prescription pain medicine, ask your doctor if you can take an over-the-counter medicine such as acetaminophen (Tylenol), ibuprofen (Advil, Motrin), or naproxen (Aleve), for cramps. Read and follow all instructions on the label. Do not take aspirin, because it can cause more bleeding. Do not take acetaminophen (Tylenol) and other acetaminophen containing medications (i.e. Percocet) at the same time. · If you think your pain medicine is making you sick to your stomach:  · Take your medicine after meals (unless your doctor has told you not to). · Ask your doctor for a different pain medicine. · If your doctor prescribed antibiotics, take them as directed. Do not stop taking them just because you feel better. You need to take the full course of antibiotics.     Mental Health  · Many women get the \"baby blues\" during the first few days after childbirth. You may lose sleep, feel irritable, and cry easily. You may feel happy one minute and sad the next. Hormone changes are one cause of these emotional changes. Also, the demands of a new baby, along with visits from relatives or other family needs, add to a mother's stress. The \"baby blues\" often peak around the fourth day. Then they ease up in less than 2 weeks. · If your moodiness or anxiety lasts for more than 2 weeks, or if you feel like life is not worth living, you may have postpartum depression. This is different for each mother. Some mothers with serious depression may worry intensely about their infant's well-being. Others may feel distant from their child. Some mothers might even feel that they might harm their baby. A mother may have signs of paranoia, wondering if someone is watching her. · With all the changes in your life, you may not know if you are depressed. Pregnancy sometimes causes changes in how you feel that are similar to the symptoms of depression. · Symptoms of depression include:  · Feeling sad or hopeless and losing interest in daily activities. These are the most common symptoms of depression. · Sleeping too much or not enough. · Feeling tired. You may feel as if you have no energy. · Eating too much or too little. · [employer]PART[employer] ([employer]I) offers a Warm line; Chat with the Expert phone sessions; Information and Articles about Pregnancy and Postpartum Mood Disorders; Comprehensive List of Free Support Groups; Knowledgeable local coordinators who will offer support, information, and resources; Guide to Resources on "[employer] Calendar of events in the  mood disorders community; Latest News and Research; and [facility] [address] for [employer]. Remember - You are not alone; You are not to blame; With help, you will be well. 9-661-748-PPD(8929). WWW. POSTPARTUM. NET   · Writing or talking about death, such as writing suicide notes or talking about guns, knives, or pills. Keep the numbers for these national suicide hotlines: 9-331-008-TALK (7-387.523.4760) and 4-297-VUBRJUO (1-540.127.8173). If you or someone you know talks about suicide or feeling hopeless, get help right away. Other instructions  · If you breast-feed your baby, you may be more comfortable while you are healing if you place the baby so that he or she is not resting on your belly. Try tucking your baby under your arm, with his or her body along the side you will be feeding on. Support your baby's upper body with your arm. With that hand you can control your baby's head to bring his or her mouth to your breast. This is sometimes called the football hold. Follow-up care is a key part of your treatment and safety. Be sure to make and go to all appointments, and call your doctor if you are having problems. It's also a good idea to know your test results and keep a list of the medicines you take. When should you call for help? Call 911 anytime you think you may need emergency care. For example, call if:  · You are thinking of hurting yourself, your baby, or anyone else. · You passed out (lost consciousness). · You have symptoms of a blood clot in your lung (called a pulmonary embolism). These may include:  · Sudden chest pain. · Trouble breathing. · Coughing up blood. Call your doctor now or seek immediate medical care if:    · You have severe vaginal bleeding. · You are soaking through a pad each hour for 2 or more hours. · Your vaginal bleeding seems to be getting heavier or is still bright red 4 days after delivery. · You are dizzy or lightheaded, or you feel like you may faint. · You are vomiting or cannot keep fluids down. · You have a fever. · You have new or more belly pain. · You have loose stitches, or your incision comes open. · You have symptoms of infection, such as:  · Increased pain, swelling, warmth, or redness.   · Red streaks leading from the incision. · Pus draining from the incision. · A fever  · You pass tissue (not just blood). · Your vaginal discharge smells bad. · Your belly feels tender or full and hard. · Your breasts are continuously painful or red. · You feel sad, anxious, or hopeless for more than a few days. · You have sudden, severe pain in your belly. · You have symptoms of a blood clot in your leg (called a deep vein thrombosis), such as:  · Pain in your calf, back of the knee, thigh, or groin. · Redness and swelling in your leg or groin. · You have symptoms of preeclampsia, such as:  · Sudden swelling of your face, hands, or feet. · New vision problems (such as dimness or blurring). · A severe headache. · Your blood pressure is higher than it should be or rises suddenly. · You have new nausea or vomiting. Watch closely for changes in your health, and be sure to contact your doctor if you have any problems. Additional Information:  Learning About Hypertensive Disorders After Childbirth    What is preeclampsia? A woman with preeclampsia has blood pressure that is higher than usual. She may also have other serious symptoms. Preeclampsia can be dangerous. When it is severe, it can cause seizures (eclampsia) or liver or kidney damage. When the liver is affected, some women get HELLP syndrome, a blood-clotting and bleeding problem. HELLP can come on quickly and can be deadly. This is why your doctor checks you and your baby often. Preeclampsia usually occurs after 20 weeks of pregnancy. In rare cases, it is first noted right after childbirth. Most often, it starts near the end of pregnancy and goes away after childbirth. What are the symptoms? Mild preeclampsia usually doesn't cause symptoms. But preeclampsia can cause rapid weight gain and sudden swelling of the hands and face. Severe preeclampsia does cause symptoms.  It can cause a very bad headache and trouble seeing and breathing. It also can cause belly pain. You may also urinate less than usual.    If you have new preeclampsia symptoms after you go home from the hospital, call your doctor right away. What can you expect after you have had preeclampsia? In the hospital  After the baby and the placenta are delivered, preeclampsia usually starts to improve. Most women get better in the first few days after childbirth. After having preeclampsia, you still have a risk of seizures for a day or more after childbirth. (Very rarely, seizures happen later on.) So your doctor may have you take magnesium sulfate for a day or more to prevent seizures. You may also take medicine to lower your blood pressure. When you go home  Your blood pressure will most likely return to normal a few days after delivery. Your doctor will want to check your blood pressure sometime in the first week after you leave the hospital.    Some women still have high blood pressure 6 weeks after childbirth. But most return to normal levels over the long term. · Take and record your blood pressure at home if your doctor tells you to. · Learn the importance of the two measures of blood pressure (such as 120 over 80, or 120/80). The first number is the systolic pressure. This is the force of blood on the artery walls as the heart pumps. The second number is the diastolic pressure. This is the force of blood on the artery walls between heartbeats, when the heart is at rest. You have a choice of monitors to use. Manual monitor: You pump up the cuff and use a stethoscope to listen for your  Pulse. · Electronic monitor: The cuff inflates, and a gauge shows your pulse rate. · To take your blood pressure:  · Ask your doctor to check your blood pressure monitor to be sure that it is accurate and that the cuff fits you. Also ask your doctor to watch you use it, to make sure that you are using it right.   · You should not eat, use tobacco products, or use medicine known to raise blood pressure (such as some nasal decongestant sprays) before you take your blood pressure. · Avoid taking your blood pressure if you have just exercised or are nervous or upset. Rest at least 15 minutes before you take your blood pressure. · Be safe with medicines. If you take medicine, take it exactly as prescribed. Call your doctor if you think you are having a problem with your medicine. · Do not smoke. Quitting smoking will help lower your blood pressure and improve your baby's growth and health. If you need help quitting, talk to your doctor about stop-smoking programs and medicines. These can increase your chances of quitting for good. · Eat a balanced and healthy diet that has lots of fruits and vegetables. Long-term health   After you have had preeclampsia, you have a higher-than-average risk of heart disease, stroke, and kidney disease. This may be because the same things that cause preeclampsia also cause heart and kidney disease. To protect your health, work with your doctor on living a heart-healthy lifestyle and getting the checkups you need. Your doctor may also want you to check your blood pressure at home. Follow-up care is a key part of your treatment and safety. Be sure to make and go to all appointments, and call your doctor if you are having problems. It's also a good idea to know your test results and keep a list of the medicines you take. Postpartum Support    PARENTS:  Are you feeling sad or depressed? Is it difficult for you to enjoy yourself? Do you feel more irritable or tense? Do you feel anxious or panicky? Are you having difficulty bonding with your baby? Do you feel as if you are \"out of control\" or \"going crazy\"? Are you worried that you might hurt your baby or yourself? FAMILIES: Do you worry that something is wrong but don't know how to help? Do you think that your partner or spouse is having problems coping?  Are you worried that it may never get better? While many women experience some mild mood change or \"the blues\" during or after the birth of a child, 1 in 9 women experience more significant symptoms of depression or anxiety. 1 in 10 Dads become depressed during the first year. Things you can do  Being a good parent includes taking care of yourself. If you take care of yourself, you will be able to take better care of your baby and your family. · Talk to a counselor or healthcare provider who has training in  mood and anxiety problems. · Learn as much as you can about pregnancy and postpartum depression and anxiety. · Get support from family and friends. Ask for help when you need it. · Join a support group in your area or online. · Keep active by walking, stretching or whatever form of exercise helps you to feel better. · Get enough rest and time for yourself. · Eat a healthy diet. · Don't give up! It may take more than one try to get the right help you need. These are general instructions for a healthy lifestyle:    No smoking/ No tobacco products/ Avoid exposure to second hand smoke    Surgeon General's Warning:  Quitting smoking now greatly reduces serious risk to your health. Obesity, smoking, and sedentary lifestyle greatly increases your risk for illness    A healthy diet, regular physical exercise & weight monitoring are important for maintaining a healthy lifestyle    Recognize signs and symptoms of STROKE:    F-face looks uneven    A-arms unable to move or move unevenly    S-speech slurred or non-existent    T-time-call 911 as soon as signs and symptoms begin - DO NOT go       back to bed or wait to see if you get better - TIME IS BRAIN. I have had the opportunity to make my options or choices for discharge. I have received and understand these instructions.

## 2020-06-19 NOTE — DISCHARGE SUMMARY
Obstetrical Discharge Summary     Name: Triston Hickey MRN: [id number]  SSN: [id number]    YOB: 1985  Age: 38 y.o. Sex: female      Admit Date: 2020    Discharge Date: 2020    Admitting Physician: Jose Ramsay MD     Attending Physician:  Gabrielle Inman MD     Admission Diagnoses: Preeclampsia [O14.90]    Discharge Diagnoses:   Information for the patient's :  [facility] [[id number]]   Delivery of a 3.646 kg male infant via , Low Transverse on 2020 at 8:47 PM  by Neel Barclay. Apgars were 3  and 6 . Additional Diagnoses:   Hospital Problems  Date Reviewed: 2018          Codes Class Noted POA    Preeclampsia ICD-10-CM: O14.90  ICD-9-CM: 642.40  2020 Unknown             Lab Results   Component Value Date/Time    Rubella, External immune 2019    GrBStrep, External negative 2020       Hospital Course: Normal hospital course following the delivery. Patient Instructions:   Current Discharge Medication List      START taking these medications    Details   ibuprofen (MOTRIN) 600 mg tablet Take 1 Tab by mouth every six (6) hours as needed for Pain. Qty: 30 Tab, Refills: 1      oxyCODONE-acetaminophen (PERCOCET) 5-325 mg per tablet Take 1 Tab by mouth every six (6) hours as needed for Pain for up to 3 days. Max Daily Amount: 4 Tabs. Qty: 16 Tab, Refills: 0    Associated Diagnoses: Pre-eclampsia during pregnancy in third trimester, antepartum         CONTINUE these medications which have NOT CHANGED    Details   omeprazole (PriLOSEC OTC) 20 mg tablet Take 20 mg by mouth daily. labetaloL (NORMODYNE) 100 mg tablet Take 1 Tab by mouth every twelve (12) hours. Qty: 60 Tab, Refills: 0      magnesium oxide (MAG-OX) 400 mg tablet Take 1 Tab by mouth daily. Qty: 60 Tab, Refills: 0      Cetirizine (ZyrTEC) 10 mg cap Take  by mouth. PNV Comb #2-Iron-Omega 3-FA [id number] mg cmpk Take  by mouth.       famotidine (Pepcid) 20 mg tablet Take 20 mg by mouth two (2) times a day. fluticasone (FLONASE) 50 mcg/actuation nasal spray 2 Sprays by Both Nostrils route daily. Qty: 1 Bottle, Refills: 1         STOP taking these medications       sertraline (ZOLOFT) 50 mg tablet Comments:   Reason for Stopping:               Disposition at Discharge: Home or self care    Condition at Discharge: Stable    Reference my discharge instructions. Follow-up Appointments   Procedures    FOLLOW UP VISIT Appointment in: One Week BP check     BP check     Standing Status:   Standing     Number of Occurrences:   1     Order Specific Question:   Appointment in     Answer:    One Week        Signed By:  Ary Alvarez MD     June 19, 2020

## 2020-06-20 VITALS
HEIGHT: 66 IN | OXYGEN SATURATION: 98 % | HEART RATE: 79 BPM | SYSTOLIC BLOOD PRESSURE: 153 MMHG | DIASTOLIC BLOOD PRESSURE: 95 MMHG | TEMPERATURE: 98 F | RESPIRATION RATE: 16 BRPM | WEIGHT: 242 LBS | BODY MASS INDEX: 38.89 KG/M2

## 2020-06-20 PROCEDURE — 74011250637 HC RX REV CODE- 250/637: Performed by: OBSTETRICS & GYNECOLOGY

## 2020-06-20 RX ORDER — LABETALOL 100 MG/1
200 TABLET, FILM COATED ORAL EVERY 12 HOURS
Qty: 60 TAB | Refills: 0 | Status: SHIPPED | OUTPATIENT
Start: 2020-06-20 | End: 2020-06-24

## 2020-06-20 RX ADMIN — IBUPROFEN 800 MG: 400 TABLET, FILM COATED ORAL at 05:42

## 2020-06-20 RX ADMIN — LABETALOL HYDROCHLORIDE 100 MG: 100 TABLET, FILM COATED ORAL at 08:49

## 2020-06-20 RX ADMIN — OXYCODONE HYDROCHLORIDE AND ACETAMINOPHEN 1 TABLET: 5; 325 TABLET ORAL at 02:33

## 2020-06-20 RX ADMIN — OXYCODONE HYDROCHLORIDE AND ACETAMINOPHEN 1 TABLET: 5; 325 TABLET ORAL at 08:10

## 2020-06-20 RX ADMIN — ONDANSETRON 8 MG: 4 TABLET, ORALLY DISINTEGRATING ORAL at 08:10

## 2020-06-20 RX ADMIN — DOCUSATE SODIUM 100 MG: 100 CAPSULE, LIQUID FILLED ORAL at 09:58

## 2020-06-20 RX ADMIN — FERROUS SULFATE TAB 325 MG (65 MG ELEMENTAL FE) 325 MG: 325 (65 FE) TAB at 08:50

## 2020-06-20 NOTE — LACTATION NOTE
This note was copied from a baby's chart. Mom and baby scheduled for discharge today. Mom states the baby has been latching better since yesterday. She was originally using a nipple shield but now the baby is able to latch with out the shield. Mom is not sure she is hearing him swallow at the breast but he has been nursing anywhere from 10-40 minutes. I watched mom attempt to latch the baby and then gave her some tips on getting the baby latched in the cross cradle hold. Baby was initially fussy at the breast but then settled in with rhythmic sucking. [de-identified] bili is 5.5 in the low risk zone. His weight loss is -9.2% at 52 hours of life. Baby has had 4 voids over the last 24 hours. His last stool was 26 hours ago. (MD aware) I recommended that mom use her pump several times this afternoon to help with breast stimulation and milk production. Any milk collected can be given to baby. Mom has been instructed to call pediatrician today to try to get appointment for weight check in the morning. We reviewed cluster feeding. Frequent feeding during the brief behavioral phase preceeding milk transition is called cluster feeding. Typical  behavior: baby becomes vigorous at the breast and wants to feed frequently- every 1-2 hours for several feedings. Emptying of the breast twice produces double in subsequent feedings. This is the normal process by which the baby demands his/her supply. This type of frequent feeding is the stimulation which causes lactogenesis II (milk coming in). Mom states baby has been doing some cluster feeding. We discussed engorgement. Breasts may become engorged when milk \"comes in\". How milk is made / normal phases of milk production, supply and demand discussed. Taught care of engorged breasts - frequent breastfeeding encouraged.  Mom should put the baby to the breast and allow him to completely finish one breast before offering the second breast. She may pump a couple minutes after nursing for comfort. She can apply ice to the breasts for 10-15 minutes after nursing as needed. Mom says her right breast is feeling blankenship. Pumping and returning to work/school discussed:  Start pumping for storage after first 2-3 weeks- about one hour after first AM feeding when supply is most abundant, once a day to start, timing of pumping at work/school, storage options and guidelines, and clean private pumping location (never in the bathroom). Reviewed with parents the signs that the baby is getting enough to drink. Baby should show feeding cues and then become more content while nursing. Baby should have periods of sleep after nursing. Voiding and stooling discussed. I encouraged mom to watch her baby and to call the pediatrician if she has any concerns. Breast feeding teaching completed and all questions answered.

## 2020-06-20 NOTE — ROUTINE PROCESS
Bedside shift change report given to CURTIS Javier RN (oncoming nurse) by Francisco Preston RN (offgoing nurse). Report included the following information SBAR.

## 2020-06-20 NOTE — PROGRESS NOTES
Post-Operative  Day 3    Duglas Lao       Assessment: Post-Op day 3, doing well    Plan:   1. Discharge home today  2. Follow up in office in 6 weeks with Jessica Fajardo MD  3. Post partum activity/wound care advised, diet as tolerated  4. Discharge Medications: pain medication per discharge summary and medications prior to admission  *labetalol increased to 200mg BID at DC  - preE precautions reviewed  - call for /110 reviewed      Information for the patient's :  Neva Gross [[id number]]   , Low Transverse   Patient doing well without significant complaint. Tolerating diet, passing flatus, voiding and ambulating without difficulty. Denies HA/visual changes/ruq pain. Vitals:  Visit Vitals  BP (!) 153/95 (BP 1 Location: Right arm, BP Patient Position: At rest)   Pulse 79   Temp 98 °F (36.7 °C)   Resp 16   Ht 5' 6\" (1.676 m)   Wt 109.8 kg (242 lb)   SpO2 98%   Breastfeeding Unknown   BMI 39.06 kg/m²     Temp (24hrs), Av.4 °F (36.9 °C), Min:98 °F (36.7 °C), Max:98.6 °F (37 °C)        Exam:        Patient without distress. Abdomen, soft, expected tenderness, fundus firm                Wound incision clean, dry and intact               Lower extremities are symmetric without tenderness, cords or erythema.     Labs:   Lab Results   Component Value Date/Time    WBC 23.9 (H) 2020 05:16 AM    WBC 11.6 (H) 2020 05:15 PM    WBC 13.3 (H) 2020 05:15 PM    WBC 11.8 (H) 2020 11:40 AM    HGB 8.7 (L) 2020 05:16 AM    HGB 11.5 2020 05:15 PM    HGB 10.0 (L) 2020 05:15 PM    HGB 11.1 (L) 2020 11:40 AM    HCT 27.5 (L) 2020 05:16 AM    HCT 35.6 2020 05:15 PM    HCT 31.7 (L) 2020 05:15 PM    HCT 35.1 2020 11:40 AM    PLATELET 773  05:16 AM    PLATELET 733  05:15 PM    PLATELET 990  05:15 PM    PLATELET 798  11:40 AM       No results found for this or any previous visit (from the past 24 hour(s)).

## 2020-06-22 ENCOUNTER — HOSPITAL ENCOUNTER (OUTPATIENT)
Age: 35
Setting detail: OBSERVATION
LOS: 1 days | Discharge: HOME | End: 2020-06-24
Attending: OBSTETRICS & GYNECOLOGY | Admitting: OBSTETRICS & GYNECOLOGY
Payer: COMMERCIAL

## 2020-06-22 DIAGNOSIS — O14.90 PRE-ECLAMPSIA, ANTEPARTUM: Primary | ICD-10-CM

## 2020-06-22 LAB
ALBUMIN SERPL-MCNC: 2.6 G/DL (ref 3.5–5)
ALBUMIN/GLOB SERPL: 0.7 {RATIO} (ref 1.1–2.2)
ALP SERPL-CCNC: 82 U/L (ref 45–117)
ALT SERPL-CCNC: 48 U/L (ref 12–78)
ANION GAP SERPL CALC-SCNC: 9 MMOL/L (ref 5–15)
AST SERPL-CCNC: 98 U/L (ref 15–37)
BILIRUB SERPL-MCNC: 0.4 MG/DL (ref 0.2–1)
BUN SERPL-MCNC: 15 MG/DL (ref 6–20)
BUN/CREAT SERPL: 19 (ref 12–20)
CALCIUM SERPL-MCNC: 8.5 MG/DL (ref 8.5–10.1)
CHLORIDE SERPL-SCNC: 106 MMOL/L (ref 97–108)
CO2 SERPL-SCNC: 23 MMOL/L (ref 21–32)
CREAT SERPL-MCNC: 0.78 MG/DL (ref 0.55–1.02)
CREAT UR-MCNC: 26.8 MG/DL
ERYTHROCYTE [DISTWIDTH] IN BLOOD BY AUTOMATED COUNT: 17.2 % (ref 11.5–14.5)
GLOBULIN SER CALC-MCNC: 3.6 G/DL (ref 2–4)
GLUCOSE SERPL-MCNC: 79 MG/DL (ref 65–100)
HCT VFR BLD AUTO: 26.9 % (ref 35–47)
HGB BLD-MCNC: 8.5 G/DL (ref 11.5–16)
LDH SERPL L TO P-CCNC: 622 U/L (ref 81–246)
MCH RBC QN AUTO: 24.8 PG (ref 26–34)
MCHC RBC AUTO-ENTMCNC: 31.6 G/DL (ref 30–36.5)
MCV RBC AUTO: 78.4 FL (ref 80–99)
NRBC # BLD: 0.12 K/UL (ref 0–0.01)
NRBC BLD-RTO: 1.1 PER 100 WBC
PLATELET # BLD AUTO: 292 K/UL (ref 150–400)
PMV BLD AUTO: 10.8 FL (ref 8.9–12.9)
POTASSIUM SERPL-SCNC: 5.1 MMOL/L (ref 3.5–5.1)
PROT SERPL-MCNC: 6.2 G/DL (ref 6.4–8.2)
PROT UR-MCNC: 12 MG/DL (ref 0–11.9)
PROT/CREAT UR-RTO: 0.4
RBC # BLD AUTO: 3.43 M/UL (ref 3.8–5.2)
SODIUM SERPL-SCNC: 138 MMOL/L (ref 136–145)
URATE SERPL-MCNC: 10.2 MG/DL (ref 2.6–6)
WBC # BLD AUTO: 11.2 K/UL (ref 3.6–11)

## 2020-06-22 PROCEDURE — 74011250637 HC RX REV CODE- 250/637: Performed by: OBSTETRICS & GYNECOLOGY

## 2020-06-22 PROCEDURE — 85027 COMPLETE CBC AUTOMATED: CPT

## 2020-06-22 PROCEDURE — 36415 COLL VENOUS BLD VENIPUNCTURE: CPT

## 2020-06-22 PROCEDURE — 99218 HC RM OBSERVATION: CPT

## 2020-06-22 PROCEDURE — 83615 LACTATE (LD) (LDH) ENZYME: CPT

## 2020-06-22 PROCEDURE — 74011000258 HC RX REV CODE- 258: Performed by: OBSTETRICS & GYNECOLOGY

## 2020-06-22 PROCEDURE — 77030012890

## 2020-06-22 PROCEDURE — 96376 TX/PRO/DX INJ SAME DRUG ADON: CPT

## 2020-06-22 PROCEDURE — 74011250636 HC RX REV CODE- 250/636: Performed by: OBSTETRICS & GYNECOLOGY

## 2020-06-22 PROCEDURE — 96375 TX/PRO/DX INJ NEW DRUG ADDON: CPT

## 2020-06-22 PROCEDURE — 74011000250 HC RX REV CODE- 250: Performed by: OBSTETRICS & GYNECOLOGY

## 2020-06-22 PROCEDURE — 84156 ASSAY OF PROTEIN URINE: CPT

## 2020-06-22 PROCEDURE — 80053 COMPREHEN METABOLIC PANEL: CPT

## 2020-06-22 PROCEDURE — 84550 ASSAY OF BLOOD/URIC ACID: CPT

## 2020-06-22 PROCEDURE — 74011250636 HC RX REV CODE- 250/636

## 2020-06-22 PROCEDURE — 96374 THER/PROPH/DIAG INJ IV PUSH: CPT

## 2020-06-22 RX ORDER — SODIUM CHLORIDE, SODIUM LACTATE, POTASSIUM CHLORIDE, CALCIUM CHLORIDE 600; 310; 30; 20 MG/100ML; MG/100ML; MG/100ML; MG/100ML
75 INJECTION, SOLUTION INTRAVENOUS CONTINUOUS
Status: DISCONTINUED | OUTPATIENT
Start: 2020-06-22 | End: 2020-06-23

## 2020-06-22 RX ORDER — LABETALOL HYDROCHLORIDE 5 MG/ML
20 INJECTION, SOLUTION INTRAVENOUS ONCE
Status: DISPENSED | OUTPATIENT
Start: 2020-06-22 | End: 2020-06-23

## 2020-06-22 RX ORDER — LABETALOL HYDROCHLORIDE 5 MG/ML
80 INJECTION, SOLUTION INTRAVENOUS ONCE
Status: COMPLETED | OUTPATIENT
Start: 2020-06-22 | End: 2020-06-22

## 2020-06-22 RX ORDER — NIFEDIPINE 30 MG/1
30 TABLET, EXTENDED RELEASE ORAL DAILY
Status: DISCONTINUED | OUTPATIENT
Start: 2020-06-23 | End: 2020-06-22

## 2020-06-22 RX ORDER — MAGNESIUM SULFATE HEPTAHYDRATE 40 MG/ML
4 INJECTION, SOLUTION INTRAVENOUS ONCE
Status: COMPLETED | OUTPATIENT
Start: 2020-06-22 | End: 2020-06-22

## 2020-06-22 RX ORDER — OXYCODONE AND ACETAMINOPHEN 5; 325 MG/1; MG/1
1 TABLET ORAL
Status: DISCONTINUED | OUTPATIENT
Start: 2020-06-22 | End: 2020-06-24 | Stop reason: HOSPADM

## 2020-06-22 RX ORDER — NIFEDIPINE 30 MG/1
30 TABLET, EXTENDED RELEASE ORAL DAILY
Status: DISCONTINUED | OUTPATIENT
Start: 2020-06-22 | End: 2020-06-24 | Stop reason: HOSPADM

## 2020-06-22 RX ORDER — LABETALOL HYDROCHLORIDE 5 MG/ML
40 INJECTION, SOLUTION INTRAVENOUS ONCE
Status: COMPLETED | OUTPATIENT
Start: 2020-06-22 | End: 2020-06-22

## 2020-06-22 RX ORDER — SODIUM CHLORIDE 0.9 % (FLUSH) 0.9 %
5-40 SYRINGE (ML) INJECTION EVERY 8 HOURS
Status: DISCONTINUED | OUTPATIENT
Start: 2020-06-22 | End: 2020-06-24 | Stop reason: HOSPADM

## 2020-06-22 RX ORDER — IBUPROFEN 400 MG/1
800 TABLET ORAL EVERY 8 HOURS
Status: DISCONTINUED | OUTPATIENT
Start: 2020-06-22 | End: 2020-06-24 | Stop reason: HOSPADM

## 2020-06-22 RX ORDER — LABETALOL HYDROCHLORIDE 5 MG/ML
20 INJECTION, SOLUTION INTRAVENOUS ONCE
Status: COMPLETED | OUTPATIENT
Start: 2020-06-22 | End: 2020-06-22

## 2020-06-22 RX ORDER — NIFEDIPINE 10 MG/1
10 CAPSULE ORAL ONCE
Status: COMPLETED | OUTPATIENT
Start: 2020-06-22 | End: 2020-06-22

## 2020-06-22 RX ORDER — MAGNESIUM SULFATE HEPTAHYDRATE 40 MG/ML
INJECTION, SOLUTION INTRAVENOUS
Status: COMPLETED
Start: 2020-06-22 | End: 2020-06-22

## 2020-06-22 RX ORDER — SODIUM CHLORIDE 0.9 % (FLUSH) 0.9 %
5-40 SYRINGE (ML) INJECTION AS NEEDED
Status: DISCONTINUED | OUTPATIENT
Start: 2020-06-22 | End: 2020-06-24 | Stop reason: HOSPADM

## 2020-06-22 RX ADMIN — NIFEDIPINE 10 MG: 10 CAPSULE ORAL at 15:10

## 2020-06-22 RX ADMIN — MAGNESIUM SULFATE HEPTAHYDRATE 2 G/HR: 500 INJECTION, SOLUTION INTRAMUSCULAR; INTRAVENOUS at 13:29

## 2020-06-22 RX ADMIN — NIFEDIPINE 30 MG: 30 TABLET, FILM COATED, EXTENDED RELEASE ORAL at 14:14

## 2020-06-22 RX ADMIN — SODIUM CHLORIDE, SODIUM LACTATE, POTASSIUM CHLORIDE, AND CALCIUM CHLORIDE 75 ML/HR: 600; 310; 30; 20 INJECTION, SOLUTION INTRAVENOUS at 18:06

## 2020-06-22 RX ADMIN — MAGNESIUM SULFATE HEPTAHYDRATE 2 G/HR: 500 INJECTION, SOLUTION INTRAMUSCULAR; INTRAVENOUS at 18:05

## 2020-06-22 RX ADMIN — MAGNESIUM SULFATE HEPTAHYDRATE 4 G: 40 INJECTION, SOLUTION INTRAVENOUS at 13:13

## 2020-06-22 RX ADMIN — IBUPROFEN 800 MG: 400 TABLET, FILM COATED ORAL at 14:14

## 2020-06-22 RX ADMIN — LABETALOL HYDROCHLORIDE 80 MG: 5 INJECTION INTRAVENOUS at 13:05

## 2020-06-22 RX ADMIN — MAGNESIUM SULFATE HEPTAHYDRATE 2 G/HR: 500 INJECTION, SOLUTION INTRAMUSCULAR; INTRAVENOUS at 23:07

## 2020-06-22 RX ADMIN — IBUPROFEN 800 MG: 400 TABLET, FILM COATED ORAL at 22:29

## 2020-06-22 RX ADMIN — SODIUM CHLORIDE, SODIUM LACTATE, POTASSIUM CHLORIDE, AND CALCIUM CHLORIDE 75 ML/HR: 600; 310; 30; 20 INJECTION, SOLUTION INTRAVENOUS at 13:16

## 2020-06-22 RX ADMIN — LABETALOL HYDROCHLORIDE 40 MG: 5 INJECTION INTRAVENOUS at 12:44

## 2020-06-22 RX ADMIN — OXYCODONE HYDROCHLORIDE AND ACETAMINOPHEN 1 TABLET: 5; 325 TABLET ORAL at 14:14

## 2020-06-22 RX ADMIN — LABETALOL HYDROCHLORIDE 20 MG: 5 INJECTION INTRAVENOUS at 12:30

## 2020-06-22 NOTE — ROUTINE PROCESS
1145: Dr Monet Cleary at bedside, patient visibly upset. 1208: orders received from Dr Monet Cleary for labs and IV labetalol. 1234: Dr Monet Cleary at bedside, order for 40mg IV labetalol. Plan to start Magnesium. 1400: patient due for Agueda, Dr Monet Cleary to put pain med orders in. 
 
1501: notified Dr Monet Cleary of increased BPs, order for 10mg immediate release Nifedipine.

## 2020-06-22 NOTE — H&P
History & Physical    Name: Salina Renee MRN: [id number]  SSN: [id number]    YOB: 1985  Age: 38 y.o. Sex: female        Subjective:     Estimated Date of Delivery: 20  OB History    Para Term  AB Living   2 1 1   1 1   SAB TAB Ectopic Molar Multiple Live Births   1       0 1      # Outcome Date GA Lbr Yonathan/2nd Weight Sex Delivery Anes PTL Lv   2 Term 20 37w0d 14:40 / 03:07 3.646 kg M CS-LTranv EPI N OTF      Complications: Failure to Progress in Second Stage   1 SAB                Ms. Fritz Lee is POD5 s/p PLTCS for arrest of descent on . Her intrapartum course was complicated by preeclampsia. She was discharged home on labetalol 200 mg bid. This am, she was at her baby's pediatric appt and had them check a BP as she lives far from [facility]. Her BP was noted to be 180s/100s. She was then seen in the office at [address] [facility] with BPs still severe range. She was advised to come straight here. She denies HA, visual changes, RUQ Pain. Her postop pain is well controlled. Lochia is light. Milk just came in yesterday and she is breastfeeding w/o issue. Baby's weight has dropped and he was supposed to have a weight check at 1400 today. Graciela Ryan did not take her am dose of labetalol due to ped's appt (usually takes dose at 10). She is very tearful.      Past Medical History:   Diagnosis Date    Anxiety     Eczema     Gestational hypertension     Gluten intolerance     Infertility, female 10/2019    IUI    Obesity      Past Surgical History:   Procedure Laterality Date    HX ENDOSCOPY      Dr. Ivory Boyer, gastric erosion    HX OTHER SURGICAL       Social History     Occupational History    Not on file   Tobacco Use    Smoking status: Never Smoker    Smokeless tobacco: Never Used   Substance and Sexual Activity    Alcohol use: Never     Frequency: Never     Comment: occ    Drug use: No    Sexual activity: Yes     Partners: Male     Birth control/protection: Pill, None     Family History Problem Relation Age of Onset    Elevated Lipids Mother     Hypertension Mother     Cancer Brother 4        leukemia       Allergies   Allergen Reactions    Codeine Nausea and Vomiting    Neosporin + Pain Relief [Neomycin-Polymyxin-Pramoxine] Rash     Patient cannot take or use and medication that has Neosporin plus     Prior to Admission medications    Medication Sig Start Date End Date Taking? Authorizing Provider   labetaloL (NORMODYNE) 100 mg tablet Take 2 Tabs by mouth every twelve (12) hours. 6/20/20  Yes Corine Avelar MD   ibuprofen (MOTRIN) 600 mg tablet Take 1 Tab by mouth every six (6) hours as needed for Pain. 6/19/20  Yes Martin Dick MD   oxyCODONE-acetaminophen (PERCOCET) 5-325 mg per tablet Take 1 Tab by mouth every six (6) hours as needed for Pain for up to 3 days. Max Daily Amount: 4 Tabs. 6/19/20 6/22/20 Yes Martin Dick MD   magnesium oxide (MAG-OX) 400 mg tablet Take 1 Tab by mouth daily. 5/23/20  Yes Martin Dick MD   Cetirizine (ZyrTEC) 10 mg cap Take  by mouth. Yes Provider, Historical   PNV Comb #2-Iron-Omega 3-FA [id number] mg cmpk Take  by mouth. Yes Provider, Historical   omeprazole (PriLOSEC OTC) 20 mg tablet Take 20 mg by mouth daily. Provider, Historical   famotidine (Pepcid) 20 mg tablet Take 20 mg by mouth two (2) times a day. Provider, Historical   fluticasone (FLONASE) 50 mcg/actuation nasal spray 2 Sprays by Both Nostrils route daily. 4/27/18   Grace Witt NP        Review of Systems: A comprehensive review of systems was negative except for that written in the HPI. Objective:     Vitals:  Vitals:    06/22/20 1215 06/22/20 1229 06/22/20 1236 06/22/20 1247   BP: 179/87 (!) 201/106 (!) 201/103 (!) 186/104   Pulse: 70      Resp:       Temp:       Weight:       Height:            Physical Exam:  Patient is tearful.    Heart: Regular rate and rhythm  Lung: clear to auscultation throughout lung fields, no wheezes, no rales, no rhonchi and normal respiratory effort  Abdomen: soft, nontender. Incision c/d/i. Prenatal Labs:   Lab Results   Component Value Date/Time    Rubella, External immune 2019    GrBStrep, External negative 2020    HBsAg, External negative 2019    HIV, External non-reactive 2019    Gonorrhea, External negative 2019    Chlamydia, External negative 2019    ABO,Rh O positive 2019         Assessment/Plan:     Active Problems:    Preeclampsia (2020)       39 y/o  POD5 s/p PLTCS admitted with severe range BPs and concern for PP preeclampsia. Plan:   1. Admit for observation  2. PreE labs  3. IV labetalol protocol initiated  4. Magnesium for seizure prophylaxis  5. Continue routine PO/PP care  6.  Breast pump to aid with breastfeeding and lactation support as needed

## 2020-06-22 NOTE — PROGRESS NOTES
1940 Report received from Jhonatan 52    2140 pumping    2235 given basin for sponge bath gown changed    2330 pumping    6/23/20    0600 up pumping  0640 Dr Joe Durham notified of elevated blood pressure order received. 0715 headache better resting without complaint    0735 Bedside shift change report given to DARWIN Chaidez (oncoming nurse) by Mark Huang Rn (offgoing nurse). Report included the following information SBAR, MAR and Med Rec Status.

## 2020-06-23 PROCEDURE — 74011250637 HC RX REV CODE- 250/637: Performed by: OBSTETRICS & GYNECOLOGY

## 2020-06-23 PROCEDURE — 74011000250 HC RX REV CODE- 250: Performed by: OBSTETRICS & GYNECOLOGY

## 2020-06-23 PROCEDURE — 99218 HC RM OBSERVATION: CPT

## 2020-06-23 PROCEDURE — 74011000258 HC RX REV CODE- 258: Performed by: OBSTETRICS & GYNECOLOGY

## 2020-06-23 PROCEDURE — 74011250636 HC RX REV CODE- 250/636: Performed by: OBSTETRICS & GYNECOLOGY

## 2020-06-23 PROCEDURE — 96376 TX/PRO/DX INJ SAME DRUG ADON: CPT

## 2020-06-23 RX ORDER — LABETALOL HYDROCHLORIDE 5 MG/ML
40 INJECTION, SOLUTION INTRAVENOUS ONCE
Status: DISCONTINUED | OUTPATIENT
Start: 2020-06-23 | End: 2020-06-23 | Stop reason: SDUPTHER

## 2020-06-23 RX ORDER — SERTRALINE HYDROCHLORIDE 50 MG/1
100 TABLET, FILM COATED ORAL DAILY
Status: DISCONTINUED | OUTPATIENT
Start: 2020-06-24 | End: 2020-06-24 | Stop reason: HOSPADM

## 2020-06-23 RX ORDER — LABETALOL HYDROCHLORIDE 5 MG/ML
20 INJECTION, SOLUTION INTRAVENOUS ONCE
Status: COMPLETED | OUTPATIENT
Start: 2020-06-23 | End: 2020-06-23

## 2020-06-23 RX ORDER — LABETALOL 200 MG/1
200 TABLET, FILM COATED ORAL EVERY 8 HOURS
Status: DISCONTINUED | OUTPATIENT
Start: 2020-06-23 | End: 2020-06-23

## 2020-06-23 RX ORDER — LABETALOL 200 MG/1
400 TABLET, FILM COATED ORAL EVERY 12 HOURS
Status: DISCONTINUED | OUTPATIENT
Start: 2020-06-24 | End: 2020-06-24

## 2020-06-23 RX ORDER — LABETALOL HYDROCHLORIDE 5 MG/ML
40 INJECTION, SOLUTION INTRAVENOUS ONCE
Status: COMPLETED | OUTPATIENT
Start: 2020-06-23 | End: 2020-06-23

## 2020-06-23 RX ORDER — LABETALOL HYDROCHLORIDE 5 MG/ML
INJECTION, SOLUTION INTRAVENOUS
Status: DISPENSED
Start: 2020-06-23 | End: 2020-06-24

## 2020-06-23 RX ORDER — NIFEDIPINE 10 MG/1
10 CAPSULE ORAL ONCE
Status: COMPLETED | OUTPATIENT
Start: 2020-06-23 | End: 2020-06-23

## 2020-06-23 RX ORDER — LABETALOL 200 MG/1
400 TABLET, FILM COATED ORAL ONCE
Status: COMPLETED | OUTPATIENT
Start: 2020-06-23 | End: 2020-06-23

## 2020-06-23 RX ADMIN — LABETALOL HYDROCHLORIDE 200 MG: 200 TABLET, FILM COATED ORAL at 15:19

## 2020-06-23 RX ADMIN — OXYCODONE HYDROCHLORIDE AND ACETAMINOPHEN 1 TABLET: 5; 325 TABLET ORAL at 00:07

## 2020-06-23 RX ADMIN — NIFEDIPINE 30 MG: 30 TABLET, FILM COATED, EXTENDED RELEASE ORAL at 08:57

## 2020-06-23 RX ADMIN — MAGNESIUM SULFATE HEPTAHYDRATE 2 G/HR: 500 INJECTION, SOLUTION INTRAMUSCULAR; INTRAVENOUS at 04:03

## 2020-06-23 RX ADMIN — LABETALOL HYDROCHLORIDE 40 MG: 5 INJECTION INTRAVENOUS at 15:34

## 2020-06-23 RX ADMIN — LABETALOL HYDROCHLORIDE 400 MG: 200 TABLET, FILM COATED ORAL at 20:31

## 2020-06-23 RX ADMIN — MAGNESIUM SULFATE HEPTAHYDRATE 2 G/HR: 500 INJECTION, SOLUTION INTRAMUSCULAR; INTRAVENOUS at 08:56

## 2020-06-23 RX ADMIN — SODIUM CHLORIDE, SODIUM LACTATE, POTASSIUM CHLORIDE, AND CALCIUM CHLORIDE 75 ML/HR: 600; 310; 30; 20 INJECTION, SOLUTION INTRAVENOUS at 11:30

## 2020-06-23 RX ADMIN — IBUPROFEN 800 MG: 400 TABLET, FILM COATED ORAL at 07:22

## 2020-06-23 RX ADMIN — IBUPROFEN 800 MG: 400 TABLET, FILM COATED ORAL at 15:19

## 2020-06-23 RX ADMIN — LABETALOL HYDROCHLORIDE 20 MG: 5 INJECTION INTRAVENOUS at 12:55

## 2020-06-23 RX ADMIN — NIFEDIPINE 10 MG: 10 CAPSULE ORAL at 06:47

## 2020-06-23 RX ADMIN — Medication 10 ML: at 22:42

## 2020-06-23 NOTE — PROGRESS NOTES
Gynecology Progress Note    Oriana Stein    Assesment: Pt is a 39 yo  POD#6 s/p 1LTCS on  now readmitted for postpartum pre-eclampsia. Plan:   Mg -- s/p 24hrs seizure prophylaxis. Plan to DC Mg once BPs no longer severe range. BPs labile -- initially responded to IV labetalol, then pt was started on Procardia 30 XL. This afternoon repeat IV Labetalol given 20 & 40 IV and labetalol 200 TID added in as well. Continue ibuprofen PRN for HA. Labs yesterday WNL on admission. Continue routine postpartum care. Will continue to monitor closely. Bandage off today/tomorrow. She is without significant complaints. Pain controlled on current medication. Voiding without difficulty. Bleeding is stable. Reports a slight HA. No visual changes. No RUQ pain.       Orders/Charges: Medium    Vitals:  Visit Vitals  BP (!) 171/96   Pulse 88   Temp 97.8 °F (36.6 °C)   Resp 14   Ht 5' 6\" (1.676 m)   Wt 110.2 kg (243 lb)   SpO2 96%   Breastfeeding Yes   BMI 39.22 kg/m²     Temp (24hrs), Av °F (36.7 °C), Min:97.8 °F (36.6 °C), Max:98.3 °F (36.8 °C)      Last 24hr Input/Output:    Intake/Output Summary (Last 24 hours) at 2020 1550  Last data filed at 2020 1100  Gross per 24 hour   Intake 3875 ml   Output 8900 ml   Net -5025 ml          Exam:  General: alert, cooperative, no distress, appears stated age     Lung: clear to auscultation bilaterally     Heart: regular rate and rhythm, S1, S2 normal, no murmur, click, rub or gallop     Abdomen: abdomen is soft without significant tenderness, masses, organomegaly or guarding, bandage in place     Extremities: extremities normal, atraumatic, no cyanosis or edema, normal post op swelling      Labs:   Lab Results   Component Value Date/Time    WBC 11.2 (H) 2020 12:14 PM    WBC 23.9 (H) 2020 05:16 AM    WBC 11.6 (H) 2020 05:15 PM    WBC 13.3 (H) 2020 05:15 PM    WBC 11.8 (H) 2020 11:40 AM    HGB 8.5 (L) 2020 12:14 PM    HGB 8.7 (L) 06/18/2020 05:16 AM    HGB 11.5 06/16/2020 05:15 PM    HGB 10.0 (L) 05/23/2020 05:15 PM    HGB 11.1 (L) 05/22/2020 11:40 AM    HCT 26.9 (L) 06/22/2020 12:14 PM    HCT 27.5 (L) 06/18/2020 05:16 AM    HCT 35.6 06/16/2020 05:15 PM    HCT 31.7 (L) 05/23/2020 05:15 PM    HCT 35.1 05/22/2020 11:40 AM    PLATELET 081 58/36/9729 12:14 PM    PLATELET 606 09/00/0471 05:16 AM    PLATELET 124 30/74/5963 05:15 PM    PLATELET 302 23/55/5446 05:15 PM    PLATELET 185 42/37/5957 11:40 AM       No results found for this or any previous visit (from the past 24 hour(s)).

## 2020-06-23 NOTE — PROGRESS NOTES
0245 Bedside report received from Chasity Teixeira RN.   1030 Pt assisted with pumping, milk kept at bedside for infant feed. 1100 Infant and  at bedside. 80 Infant and  left, pt eating lunch, denies complaints. 36 Pt assisted with pumping, encouraged rest.   1730 Pt resting with eyes closed. 1930 Bedside report given to Leonid Alvarado RN.

## 2020-06-23 NOTE — PROGRESS NOTES
1935 Bedside and Verbal shift change report given to ANA Le RN  (oncoming nurse) by Raj Girard RN  (offgoing nurse). Report included the following information SBAR, Procedure Summary, Intake/Output, MAR and Recent Results. 2025 Dr. Genny David notified of severe range BP's and clonus - 3beats. Verbal order for 400 mg Labetalol PO now. 2031 Labetalol 400mg PO given. Lights dimmed and encouraged patient to rest    2230 Assisted patient with pumping. 2330    TRANSFER - OUT REPORT:    Verbal report given to Joshua Davis RN(name) on Dwain Cardoso  being transferred to [facility]) for routine progression of care       Report consisted of patients Situation, Background, Assessment and   Recommendations(SBAR). Information from the following report(s) SBAR, Procedure Summary, Intake/Output, MAR and Recent Results was reviewed with the receiving nurse. Lines:   Peripheral IV 06/22/20 Anterior; Left Hand (Active)   Site Assessment Clean, dry, & intact 6/23/2020  7:42 PM   Phlebitis Assessment 0 6/23/2020  7:42 PM   Infiltration Assessment 0 6/23/2020  7:42 PM   Dressing Status Clean, dry, & intact 6/23/2020  7:42 PM   Dressing Type Tape 6/23/2020  7:42 PM   Hub Color/Line Status Capped 6/23/2020  7:42 PM   Action Taken Blood drawn 6/22/2020  2:01 PM        Opportunity for questions and clarification was provided.       Patient transported with:   Registered Nurse

## 2020-06-24 VITALS
HEIGHT: 66 IN | SYSTOLIC BLOOD PRESSURE: 147 MMHG | DIASTOLIC BLOOD PRESSURE: 87 MMHG | TEMPERATURE: 98.1 F | OXYGEN SATURATION: 98 % | HEART RATE: 81 BPM | RESPIRATION RATE: 16 BRPM | BODY MASS INDEX: 39.05 KG/M2 | WEIGHT: 243 LBS

## 2020-06-24 PROCEDURE — 74011250637 HC RX REV CODE- 250/637: Performed by: OBSTETRICS & GYNECOLOGY

## 2020-06-24 PROCEDURE — 99218 HC RM OBSERVATION: CPT

## 2020-06-24 RX ORDER — LABETALOL 200 MG/1
400 TABLET, FILM COATED ORAL EVERY 8 HOURS
Qty: 90 TAB | Refills: 1 | Status: SHIPPED | OUTPATIENT
Start: 2020-06-24

## 2020-06-24 RX ORDER — SERTRALINE HYDROCHLORIDE 100 MG/1
100 TABLET, FILM COATED ORAL DAILY
Qty: 30 TAB | Refills: 1 | Status: SHIPPED | OUTPATIENT
Start: 2020-06-25

## 2020-06-24 RX ORDER — LABETALOL 200 MG/1
400 TABLET, FILM COATED ORAL EVERY 8 HOURS
Status: DISCONTINUED | OUTPATIENT
Start: 2020-06-24 | End: 2020-06-24 | Stop reason: HOSPADM

## 2020-06-24 RX ORDER — OXYCODONE AND ACETAMINOPHEN 5; 325 MG/1; MG/1
1 TABLET ORAL
Qty: 20 TAB | Refills: 0 | Status: SHIPPED | OUTPATIENT
Start: 2020-06-24 | End: 2020-07-01

## 2020-06-24 RX ORDER — NIFEDIPINE 30 MG/1
30 TABLET, EXTENDED RELEASE ORAL DAILY
Qty: 30 TAB | Refills: 1 | Status: SHIPPED | OUTPATIENT
Start: 2020-06-25

## 2020-06-24 RX ADMIN — IBUPROFEN 800 MG: 400 TABLET, FILM COATED ORAL at 06:39

## 2020-06-24 RX ADMIN — OXYCODONE HYDROCHLORIDE AND ACETAMINOPHEN 1 TABLET: 5; 325 TABLET ORAL at 00:15

## 2020-06-24 RX ADMIN — LABETALOL HYDROCHLORIDE 400 MG: 200 TABLET, FILM COATED ORAL at 08:33

## 2020-06-24 RX ADMIN — IBUPROFEN 800 MG: 400 TABLET, FILM COATED ORAL at 14:21

## 2020-06-24 RX ADMIN — Medication 10 ML: at 14:21

## 2020-06-24 RX ADMIN — OXYCODONE HYDROCHLORIDE AND ACETAMINOPHEN 1 TABLET: 5; 325 TABLET ORAL at 06:38

## 2020-06-24 RX ADMIN — Medication 10 ML: at 08:34

## 2020-06-24 RX ADMIN — NIFEDIPINE 30 MG: 30 TABLET, FILM COATED, EXTENDED RELEASE ORAL at 08:33

## 2020-06-24 RX ADMIN — LABETALOL HYDROCHLORIDE 400 MG: 200 TABLET, FILM COATED ORAL at 14:21

## 2020-06-24 RX ADMIN — IBUPROFEN 800 MG: 400 TABLET, FILM COATED ORAL at 00:15

## 2020-06-24 NOTE — PROGRESS NOTES
I have reviewed discharge instructions with the patient. The patient verbalized understanding. IV removed.

## 2020-06-24 NOTE — DISCHARGE SUMMARY
Gynecology Discharge Summary     Patient ID:  Cindy Mckinney  [id number]  26 y.o.  1985    Admit date: 6/22/2020    Discharge date: 6/24/2020     Admission Diagnoses:   Patient Active Problem List   Diagnosis Code    Pap smear for cervical cancer screening Z12.4    Gluten intolerance K90.41    Eczema L30.9    Anxiety F41.9    Obesity E66.9    Pre-eclampsia during pregnancy in third trimester, antepartum O14.93    Preeclampsia O14.90       Discharge Diagnoses: There are no discharge diagnoses documented for the most recent discharge. Patient Active Problem List   Diagnosis Code    Pap smear for cervical cancer screening Z12.4    Gluten intolerance K90.41    Eczema L30.9    Anxiety F41.9    Obesity E66.9    Pre-eclampsia during pregnancy in third trimester, antepartum O14.93    Preeclampsia O14.90       Procedures for this admission:     Hospital Course:  Patient was admitted for severe range BPs and was dx with post partum Pre eclampsia with severe features. She was given IV magnesium for 24 hours for seizure ppx. Her labs were normal.  She required several doses of IV labetalol to improve BPs then was placed on labetalol po and procardia XL which were titrated until BPs were normal to mild range. Ultimate dose required is labetalol 400mg po tid and Procardia XL 30mg daily. She is asymptomatic and is discharged home on [facility] with close f/u in 1 wk for BP check. She will take daily BPs at home to ensure not in the severe range and  Precautions were given. Disposition: home    Discharged Condition: stable            Patient Instructions:   Current Discharge Medication List      START taking these medications    Details   NIFEdipine ER (PROCARDIA XL) 30 mg ER tablet Take 1 Tab by mouth daily. Qty: 30 Tab, Refills: 1      sertraline (ZOLOFT) 100 mg tablet Take 1 Tab by mouth daily.   Qty: 30 Tab, Refills: 1         CONTINUE these medications which have CHANGED    Details   labetaloL ([address] [place]) 200 mg tablet Take 2 Tabs by mouth every eight (8) hours. Qty: 90 Tab, Refills: 1      oxyCODONE-acetaminophen (PERCOCET) 5-325 mg per tablet Take 1 Tab by mouth every six (6) hours as needed for Pain for up to 7 days. Max Daily Amount: 4 Tabs. Qty: 20 Tab, Refills: 0    Associated Diagnoses: Pre-eclampsia, antepartum         CONTINUE these medications which have NOT CHANGED    Details   ibuprofen (MOTRIN) 600 mg tablet Take 1 Tab by mouth every six (6) hours as needed for Pain. Qty: 30 Tab, Refills: 1      PNV Comb #2-Iron-Omega 3-FA [id number] mg cmpk Take  by mouth. fluticasone (FLONASE) 50 mcg/actuation nasal spray 2 Sprays by Both Nostrils route daily. Qty: 1 Bottle, Refills: 1         STOP taking these medications       magnesium oxide (MAG-OX) 400 mg tablet Comments:   Reason for Stopping:         Cetirizine (ZyrTEC) 10 mg cap Comments:   Reason for Stopping:         omeprazole (PriLOSEC OTC) 20 mg tablet Comments:   Reason for Stopping:         famotidine (Pepcid) 20 mg tablet Comments:   Reason for Stopping:             Activity: No lifting, Driving, or Strenuous exercise for 6 weeks  Diet: Regular Diet  Wound Care: Keep wound clean and dry    Follow-up with Dr. Michela Huggins in 1 week.     Signed:  Connie Frazier MD  6/24/2020  1:17 PM

## 2020-06-24 NOTE — PROGRESS NOTES
Bedside and Verbal shift change report given to [employer] (oncoming nurse) by Robert Carpenter (offgoing nurse). Report included the following information SBAR, Kardex, Intake/Output, MAR, Accordion and Recent Results.

## 2020-06-24 NOTE — PROGRESS NOTES
Gynecology Progress Note    Dilan Teague    Assesment: Pt is a 39 yo  POD#7 s/p 1LTCS on  readmitted for postpartum pre-eclampsia s/p mag x 24 hours now with well controlled BPs on labetalol 400mg po tid and procardia 30mg XL daily.       Plan: Discharge home today with: Medications: medications prior to admission and labetalol and procardia. Follow up: 1 week for BP check. Diet: as tolerated Activity: as tolerated, no heavy lifting and pelvic rest    She is without significant complaints. Pain controlled on current medication. Voiding without difficulty. Patient is passing flatus. She is is tolerating her diet. She denies HA, visual changes or RUQ pain. Breast pumping is going well.      Orders/Charges: Medium    Vitals:  Visit Vitals  /90 (BP 1 Location: Right arm, BP Patient Position: At rest;Head of bed elevated (Comment degrees))   Pulse 77   Temp 98.1 °F (36.7 °C)   Resp 16   Ht 5' 6\" (1.676 m)   Wt 110.2 kg (243 lb)   SpO2 98%   Breastfeeding Yes   BMI 39.22 kg/m²     Temp (24hrs), Av.4 °F (36.9 °C), Min:98 °F (36.7 °C), Max:98.9 °F (37.2 °C)      Last 24hr Input/Output:  No intake or output data in the 24 hours ending 20 1311       Exam:  General: alert, cooperative, no distress, appears stated age     Lung: clear to auscultation bilaterally     Heart: regular rate and rhythm, S1, S2 normal, no murmur, click, rub or gallop     Abdomen: abdomen is soft without significant tenderness, masses, organomegaly or guarding; incision is clean/dry and intact, healing well     Extremities: extremities normal, atraumatic, no cyanosis, edema nonpitting, much improved      Labs:   Lab Results   Component Value Date/Time    WBC 11.2 (H) 2020 12:14 PM    WBC 23.9 (H) 2020 05:16 AM    WBC 11.6 (H) 2020 05:15 PM    WBC 13.3 (H) 2020 05:15 PM    WBC 11.8 (H) 2020 11:40 AM    HGB 8.5 (L) 2020 12:14 PM    HGB 8.7 (L) 2020 05:16 AM    HGB 11.5 2020 05:15 PM    HGB 10.0 (L) 05/23/2020 05:15 PM    HGB 11.1 (L) 05/22/2020 11:40 AM    HCT 26.9 (L) 06/22/2020 12:14 PM    HCT 27.5 (L) 06/18/2020 05:16 AM    HCT 35.6 06/16/2020 05:15 PM    HCT 31.7 (L) 05/23/2020 05:15 PM    HCT 35.1 05/22/2020 11:40 AM    PLATELET 193 00/21/4112 12:14 PM    PLATELET 851 05/24/8039 05:16 AM    PLATELET 460 54/16/6325 05:15 PM    PLATELET 108 59/55/7302 05:15 PM    PLATELET 332 98/48/5687 11:40 AM       No results found for this or any previous visit (from the past 24 hour(s)).

## 2020-06-24 NOTE — PHYSICIAN ADVISORY
Letter of Status Determination:  
Recommend hospitalization status upgraded from OBSERVATION  to INPATIENT  Status Pt Name:  Eduardo Green MR#  
AYAAN # [id number] / 
[id number] Payor: Sanna Conti / Plan: Ashanti Cuff / Product Type: PPO /   
BRIANA#  [id number] Room [facility] Hospital  321/01  @ [facility] hospital  
Hospitalization date  6/22/2020 11:38 AM  
Current Attending Physician  Michele Rankin MD  
Principal diagnosis  Preeclampsia Clinicals  Ms. Karl Cronin is POD5 s/p PLTCS for arrest of descent on 6/17. Her intrapartum course was complicated by preeclampsia. She was discharged home on labetalol 200 mg bid. This am, she was at her baby's pediatric appt and had them check a BP as she lives far from [facility]. Her BP was noted to be 180s/100s. She was then seen in the office at [facility] with BPs still severe range. She was advised to come straight here. She denies HA, visual changes, RUQ Pain. Her postop pain is well controlled. Lochia is light. Milk just came in yesterday and she is breastfeeding w/o issue. Baby's weight has dropped and he was supposed to have a weight check at 1400 today. Kathleen Pham did not take her am dose of labetalol due to ped's appt (usually takes dose at 10). Pt with elevated BP, eclampsia range, s/p magnesium IV, labetalol IV  
  
  
[place] (MCG) criteria Does  NOT apply STATUS DETERMINATION  INPATIENT The final decision of the patient's hospitalization status depends on the attending physician's judgment Additional comments Payor: Sanna Conti / Plan: Ashanti Cuff / Product Type: PPO /   
  
 
Charanjit Beebe MD 
Cell: [phone] Physician Advisor

## 2020-06-24 NOTE — PROGRESS NOTES
TRANSFER - IN REPORT:    Verbal report received from Julito NEGRONRN(name) on Guru Joseph  being received from L&D(unit) for routine progression of care      Report consisted of patients Situation, Background, Assessment and   Recommendations(SBAR). Information from the following report(s) SBAR, Kardex, MAR and Accordion was reviewed with the receiving nurse. Opportunity for questions and clarification was provided. Assessment completed upon patients arrival to unit and care assumed.

## 2020-06-24 NOTE — PROGRESS NOTES
Observation notice provided in writing to patient and/or caregiver as well as verbal explanation of the policy. Patients who are in outpatient status also receive the Observation notice. This CM met with patient to discuss OBS status. Form signed and placed in chart to be scanned.  is Jennifer Payment, [phone] and will transport at dc. CM will be available if any needs for CM arise prior to dc.     RUR 5%  Jennifer Davis  9:39 AM

## 2020-10-19 NOTE — DISCHARGE INSTRUCTIONS
Patient Education        Learning About Preeclampsia After Childbirth  What is preeclampsia? A woman with preeclampsia has blood pressure that is higher than usual. She may also have other serious symptoms. Preeclampsia can be dangerous. When it is severe, it can cause seizures (eclampsia) or liver or kidney damage. When the liver is affected, some women get HELLP syndrome, a blood-clotting and bleeding problem. HELLP can come on quickly and can be deadly. This is why your doctor checks you and your baby often. Preeclampsia usually occurs after 20 weeks of pregnancy. Most often, it starts near the end of pregnancy and goes away after childbirth. But symptoms may last a few weeks or more and can get worse after delivery. Rarely, symptoms of preeclampsia don't show up until days or even weeks after childbirth. What are the symptoms? Mild preeclampsia usually doesn't cause symptoms. But preeclampsia can cause rapid weight gain and sudden swelling of the hands and face. Severe preeclampsia does cause symptoms. It can cause a very bad headache and trouble seeing and breathing. It also can cause belly pain. You may also urinate less than usual.  If you have new preeclampsia symptoms after you go home from the hospital, call your doctor right away. What can you expect after you have had preeclampsia? In the hospital  After the baby and the placenta are delivered, preeclampsia usually starts to improve. Most women get better in the first few days after childbirth. After having preeclampsia, you still have a risk of seizures for a day or more after childbirth. (Very rarely, seizures happen later on.) So your doctor may have you take magnesium sulfate for a day or more to prevent seizures. You may also take medicine to lower your blood pressure. When you go home  Your blood pressure will most likely return to normal a few days after delivery.  Your doctor will want to check your blood pressure sometime in the first week after you leave the hospital.  Some women still have high blood pressure 6 weeks after childbirth. But most return to normal levels over the long term. · Take and record your blood pressure at home if your doctor tells you to. ? Learn the importance of the two measures of blood pressure (such as 120 over 80, or 120/80). The first number is the systolic pressure. This is the force of blood on the artery walls as the heart pumps. The second number is the diastolic pressure. This is the force of blood on the artery walls between heartbeats, when the heart is at rest. You have a choice of monitors to use. § Manual monitor: You pump up the cuff and use a stethoscope to listen for your pulse. § Electronic monitor: The cuff inflates, and a gauge shows your pulse rate. ? To take your blood pressure:  § Ask your doctor to check your blood pressure monitor to be sure that it is accurate and that the cuff fits you. Also ask your doctor to watch you use it, to make sure that you are using it right. § You should not eat, use tobacco products, or use medicine known to raise blood pressure (such as some nasal decongestant sprays) before you take your blood pressure. § Avoid taking your blood pressure if you have just exercised. Also avoid taking it if you are nervous or upset. Rest at least 15 minutes before you take your blood pressure. · Be safe with medicines. If you take medicine, take it exactly as prescribed. Call your doctor if you think you are having a problem with your medicine. · Do not smoke. Quitting smoking will help improve your baby's growth and health. If you need help quitting, talk to your doctor about stop-smoking programs and medicines. These can increase your chances of quitting for good. · Eat a balanced and healthy diet that has lots of fruits and vegetables.   Long-term health  After you have had preeclampsia, you have a higher-than-average risk of heart disease, stroke, and kidney disease. This may be because the same things that cause preeclampsia also cause heart and kidney disease. To protect your health, work with your doctor on living a heart-healthy lifestyle and getting the checkups you need. Your doctor may also want you to check your blood pressure at home. Follow-up care is a key part of your treatment and safety. Be sure to make and go to all appointments, and call your doctor if you are having problems. It's also a good idea to know your test results and keep a list of the medicines you take. Where can you learn more? Go to http://[employer].info/  Enter Q718 in the search box to learn more about \"Learning About Preeclampsia After Childbirth. \"  Current as of: February 11, 2020               Content Version: 12.5  © 2006-2020 [employer]. Care instructions adapted under license by [employer] (which disclaims liability or warranty for this information). If you have questions about a medical condition or this instruction, always ask your healthcare professional. [address] any warranty or liability for your use of this information. good balance

## 2022-03-19 PROBLEM — O14.93 PRE-ECLAMPSIA DURING PREGNANCY IN THIRD TRIMESTER, ANTEPARTUM: Status: ACTIVE | Noted: 2020-05-22

## 2022-03-19 PROBLEM — O14.90 PREECLAMPSIA: Status: ACTIVE | Noted: 2020-06-16

## 2023-05-20 RX ORDER — NIFEDIPINE 30 MG/1
30 TABLET, EXTENDED RELEASE ORAL DAILY
COMMUNITY
Start: 2020-06-25

## 2023-05-20 RX ORDER — FLUTICASONE PROPIONATE 50 MCG
2 SPRAY, SUSPENSION (ML) NASAL DAILY
COMMUNITY
Start: 2018-04-27

## 2023-05-20 RX ORDER — SERTRALINE HYDROCHLORIDE 100 MG/1
100 TABLET, FILM COATED ORAL DAILY
COMMUNITY
Start: 2020-06-25

## 2023-05-20 RX ORDER — IBUPROFEN 600 MG/1
600 TABLET ORAL EVERY 6 HOURS PRN
COMMUNITY
Start: 2020-06-19

## 2023-05-20 RX ORDER — LABETALOL 200 MG/1
400 TABLET, FILM COATED ORAL EVERY 8 HOURS
COMMUNITY
Start: 2020-06-24

## (undated) DEVICE — SUTURE VCRL SZ 2-0 L36IN ABSRB VLT L36MM CT-1 1/2 CIR J345H

## (undated) DEVICE — AGENT HEMSTAT 3GM PURIFIED PLNT STARCH PWD ABSRB ARISTA AH

## (undated) DEVICE — SOLUTION IV 1000ML 0.9% SOD CHL

## (undated) DEVICE — MASK ROUND 60MM FPH (10) S/USE: Brand: FISHER & PAYKEL HEALTHCARE

## (undated) DEVICE — TOWEL,OR,DSP,ST,BLUE,STD,2/PK,40PK/CS: Brand: MEDLINE

## (undated) DEVICE — DEVON™ KNEE AND BODY STRAP 60" X 3" (1.5 M X 7.6 CM): Brand: DEVON

## (undated) DEVICE — CATH FOLEY 16F LUBRI-SIL IC --

## (undated) DEVICE — LIGHT HANDLE: Brand: DEVON

## (undated) DEVICE — (D)PREP SKN CHLRAPRP APPL 26ML -- CONVERT TO ITEM 371833

## (undated) DEVICE — REM POLYHESIVE ADULT PATIENT RETURN ELECTRODE: Brand: VALLEYLAB

## (undated) DEVICE — ROYALSILK SURGICAL GOWN, L: Brand: CONVERTORS

## (undated) DEVICE — STERILE POLYISOPRENE POWDER-FREE SURGICAL GLOVES: Brand: PROTEXIS

## (undated) DEVICE — SOLIDIFIER FLUID 3000 CC ABSORB

## (undated) DEVICE — CATH SUC CTRL PRT TRIFLO 8FR --

## (undated) DEVICE — SUTURE MCRYL SZ 0 L36IN ABSRB UD L36MM CT-1 1/2 CIR Y946H

## (undated) DEVICE — ELECTROSURGICAL DEVICE HOLSTER;FOR USE WITH MAXIMUM PEAK VOLTAGE OF 4000 V: Brand: FORCE TRIVERSE

## (undated) DEVICE — MEDI-VAC NON-CONDUCTIVE SUCTION TUBING: Brand: CARDINAL HEALTH

## (undated) DEVICE — LARGE, DISPOSABLE ALEXIS O C-SECTION PROTECTOR - RETRACTOR: Brand: ALEXIS ® O C-SECTION PROTECTOR - RETRACTOR

## (undated) DEVICE — 3000CC GUARDIAN II: Brand: GUARDIAN

## (undated) DEVICE — SPONGE: LAP 18X18 W  200/CS: Brand: MEDICAL ACTION INDUSTRIES

## (undated) DEVICE — COVERALL PREM SMS 2XL KNIT --

## (undated) DEVICE — DRAPE FLD WRM W44XL66IN C6L FOR INTRATEMP SYS THERMABASIN

## (undated) DEVICE — KENDALL SCD EXPRESS SLEEVES, KNEE LENGTH, MEDIUM: Brand: KENDALL SCD

## (undated) DEVICE — Device: Brand: PORTEX

## (undated) DEVICE — PACK PROCEDURE SURG C SECT KT SMH

## (undated) DEVICE — SUTURE VCRL SZ 1 L36IN ABSRB VLT L48MM CTX 1/2 CIR J371H

## (undated) DEVICE — STAPLER SKIN SQ 30 ABSRB STPL DISP INSORB

## (undated) DEVICE — S/USE RESUS KIT W/O MASK (10): Brand: FISHER & PAYKEL HEALTHCARE

## (undated) DEVICE — STERILE POLYISOPRENE POWDER-FREE SURGICAL GLOVES WITH EMOLLIENT COATING: Brand: PROTEXIS

## (undated) DEVICE — SUTURE PLN GUT SZ 2-0 L27IN ABSRB YELLOWISH TAN L70MM XLH 53T

## (undated) DEVICE — SOLUTION IRRIG 1000ML H2O STRL BLT